# Patient Record
Sex: FEMALE | Race: WHITE | Employment: UNEMPLOYED | URBAN - METROPOLITAN AREA
[De-identification: names, ages, dates, MRNs, and addresses within clinical notes are randomized per-mention and may not be internally consistent; named-entity substitution may affect disease eponyms.]

---

## 2017-02-13 PROCEDURE — 84702 CHORIONIC GONADOTROPIN TEST: CPT | Performed by: OBSTETRICS & GYNECOLOGY

## 2017-02-13 PROCEDURE — 36415 COLL VENOUS BLD VENIPUNCTURE: CPT | Performed by: OBSTETRICS & GYNECOLOGY

## 2017-02-15 PROCEDURE — 84702 CHORIONIC GONADOTROPIN TEST: CPT | Performed by: OBSTETRICS & GYNECOLOGY

## 2017-02-15 PROCEDURE — 84144 ASSAY OF PROGESTERONE: CPT | Performed by: OBSTETRICS & GYNECOLOGY

## 2017-02-15 PROCEDURE — 36415 COLL VENOUS BLD VENIPUNCTURE: CPT | Performed by: OBSTETRICS & GYNECOLOGY

## 2017-08-06 PROBLEM — M25.512 ACUTE PAIN OF LEFT SHOULDER: Status: ACTIVE | Noted: 2017-08-06

## 2017-08-30 PROBLEM — M75.42 IMPINGEMENT SYNDROME OF LEFT SHOULDER: Status: ACTIVE | Noted: 2017-08-30

## 2017-10-03 ENCOUNTER — APPOINTMENT (OUTPATIENT)
Dept: LAB | Age: 37
End: 2017-10-03
Attending: INTERNAL MEDICINE
Payer: COMMERCIAL

## 2017-10-03 DIAGNOSIS — J02.9 SORE THROAT: ICD-10-CM

## 2017-10-03 PROCEDURE — 87081 CULTURE SCREEN ONLY: CPT

## 2018-02-14 ENCOUNTER — APPOINTMENT (OUTPATIENT)
Dept: LAB | Age: 38
End: 2018-02-14
Attending: INTERNAL MEDICINE
Payer: COMMERCIAL

## 2018-02-14 PROCEDURE — 87502 INFLUENZA DNA AMP PROBE: CPT | Performed by: INTERNAL MEDICINE

## 2018-02-14 PROCEDURE — 87798 DETECT AGENT NOS DNA AMP: CPT | Performed by: INTERNAL MEDICINE

## 2018-03-19 PROCEDURE — 84144 ASSAY OF PROGESTERONE: CPT | Performed by: OBSTETRICS & GYNECOLOGY

## 2018-03-19 PROCEDURE — 36415 COLL VENOUS BLD VENIPUNCTURE: CPT | Performed by: OBSTETRICS & GYNECOLOGY

## 2018-04-24 PROCEDURE — 88175 CYTOPATH C/V AUTO FLUID REDO: CPT | Performed by: OBSTETRICS & GYNECOLOGY

## 2018-04-24 PROCEDURE — 87086 URINE CULTURE/COLONY COUNT: CPT | Performed by: OBSTETRICS & GYNECOLOGY

## 2018-04-24 PROCEDURE — 87624 HPV HI-RISK TYP POOLED RSLT: CPT | Performed by: OBSTETRICS & GYNECOLOGY

## 2018-05-02 PROCEDURE — 87389 HIV-1 AG W/HIV-1&-2 AB AG IA: CPT | Performed by: OBSTETRICS & GYNECOLOGY

## 2018-05-02 PROCEDURE — 86900 BLOOD TYPING SEROLOGIC ABO: CPT | Performed by: OBSTETRICS & GYNECOLOGY

## 2018-05-02 PROCEDURE — 85025 COMPLETE CBC W/AUTO DIFF WBC: CPT | Performed by: OBSTETRICS & GYNECOLOGY

## 2018-05-02 PROCEDURE — 86870 RBC ANTIBODY IDENTIFICATION: CPT | Performed by: OBSTETRICS & GYNECOLOGY

## 2018-05-02 PROCEDURE — 86077 PHYS BLOOD BANK SERV XMATCH: CPT | Performed by: OBSTETRICS & GYNECOLOGY

## 2018-05-02 PROCEDURE — 87340 HEPATITIS B SURFACE AG IA: CPT | Performed by: OBSTETRICS & GYNECOLOGY

## 2018-05-02 PROCEDURE — 86901 BLOOD TYPING SEROLOGIC RH(D): CPT | Performed by: OBSTETRICS & GYNECOLOGY

## 2018-05-02 PROCEDURE — 86762 RUBELLA ANTIBODY: CPT | Performed by: OBSTETRICS & GYNECOLOGY

## 2018-05-02 PROCEDURE — 86780 TREPONEMA PALLIDUM: CPT | Performed by: OBSTETRICS & GYNECOLOGY

## 2018-05-02 PROCEDURE — 36415 COLL VENOUS BLD VENIPUNCTURE: CPT | Performed by: OBSTETRICS & GYNECOLOGY

## 2018-05-02 PROCEDURE — 86850 RBC ANTIBODY SCREEN: CPT | Performed by: OBSTETRICS & GYNECOLOGY

## 2018-06-22 PROCEDURE — 87510 GARDNER VAG DNA DIR PROBE: CPT | Performed by: OBSTETRICS & GYNECOLOGY

## 2018-06-22 PROCEDURE — 87660 TRICHOMONAS VAGIN DIR PROBE: CPT | Performed by: OBSTETRICS & GYNECOLOGY

## 2018-06-22 PROCEDURE — 87480 CANDIDA DNA DIR PROBE: CPT | Performed by: OBSTETRICS & GYNECOLOGY

## 2018-07-05 ENCOUNTER — OFFICE VISIT (OUTPATIENT)
Dept: PERINATAL CARE | Facility: HOSPITAL | Age: 38
End: 2018-07-05
Attending: OBSTETRICS & GYNECOLOGY
Payer: COMMERCIAL

## 2018-07-05 VITALS
HEART RATE: 78 BPM | DIASTOLIC BLOOD PRESSURE: 63 MMHG | BODY MASS INDEX: 28.58 KG/M2 | SYSTOLIC BLOOD PRESSURE: 121 MMHG | WEIGHT: 193 LBS | HEIGHT: 69 IN

## 2018-07-05 DIAGNOSIS — O09.299 HISTORY OF OLIGOHYDRAMNIOS IN PRIOR PREGNANCY, CURRENTLY PREGNANT: ICD-10-CM

## 2018-07-05 DIAGNOSIS — O09.522 ELDERLY MULTIGRAVIDA IN SECOND TRIMESTER: ICD-10-CM

## 2018-07-05 PROBLEM — O09.529 AMA (ADVANCED MATERNAL AGE) MULTIGRAVIDA 35+: Status: ACTIVE | Noted: 2018-07-05

## 2018-07-05 PROBLEM — O09.529 AMA (ADVANCED MATERNAL AGE) MULTIGRAVIDA 35+ (HCC): Status: ACTIVE | Noted: 2018-07-05

## 2018-07-05 PROCEDURE — 76811 OB US DETAILED SNGL FETUS: CPT | Performed by: OBSTETRICS & GYNECOLOGY

## 2018-07-05 PROCEDURE — 99243 OFF/OP CNSLTJ NEW/EST LOW 30: CPT | Performed by: OBSTETRICS & GYNECOLOGY

## 2018-07-05 NOTE — PROGRESS NOTES
Pt here for level 2 ultrasound accompanied by her    States + occasional flutters  Had some pink discharge last week.  States OB aware

## 2018-07-05 NOTE — PROGRESS NOTES
Outpatient Maternal-Fetal Medicine Consultation    Dear Dr. Ayan Vásquez,    Thank you for requesting ultrasound evaluation and maternal fetal medicine consultation on your patient Sondra Murray.   As you are aware she is a 40year old female with a Singleto . She indicated that both of her sons are alive. She indicated that the status of her paternal aunt is unknown.    She reports no family history of birth defects, developmental delay, stillbirth or  deaths in her family or her 's fami OFD, HC, TCD, AC, FL and HUM.  ____________________________________________________________________________  Anatomy Scan:  Reddy gestation.   Biometry:  BPD 49.5 mm 86th% 21w0d (20w3d to 21w4d)  .7 mm 54th% 20w2d (18w5d to 21w5d)  .7 mm 79t associated with elevated risks.  Specifically, there is a higher rate of:  · Fetal malformations  · Preeclampsia  · Gestational diabetes  · Intrauterine fetal death    As a result, enhanced pregnancy surveillance is advised for these patients including a co women 28years of age or older. In this model, weekly testing starting at 36 weeks of gestation would drop the risk of fetal death from 5.2 to 1.3 per 1000 pregnancies.  While a policy of antepartum testing in older women does increase the chance that a wom lowest false positive rate) for the detection of fetal aneuploidy amongst high-risk patients. The limitations of detailed mid-trimester sonography was reviewed with the patient.  First trimester screening and second trimester multiple-marker serum serum scr

## 2018-08-09 ENCOUNTER — TELEPHONE (OUTPATIENT)
Dept: OBGYN CLINIC | Facility: CLINIC | Age: 38
End: 2018-08-09

## 2018-08-09 NOTE — TELEPHONE ENCOUNTER
Chart sent to scan dept. pt requesting her medical recoreds to be sent to dr Saray Alberts @ [facility] [place] [facility] [address] [place] [id number] # [phone]

## 2018-08-22 PROCEDURE — 87389 HIV-1 AG W/HIV-1&-2 AB AG IA: CPT | Performed by: OBSTETRICS & GYNECOLOGY

## 2018-11-02 ENCOUNTER — HOSPITAL ENCOUNTER (OUTPATIENT)
Facility: HOSPITAL | Age: 38
Setting detail: OBSERVATION
Discharge: HOME | End: 2018-11-02
Attending: OBSTETRICS & GYNECOLOGY | Admitting: OBSTETRICS & GYNECOLOGY
Payer: COMMERCIAL

## 2018-11-02 VITALS — WEIGHT: 218 LBS | HEIGHT: 69.02 IN | BODY MASS INDEX: 32.29 KG/M2

## 2018-11-02 PROBLEM — Z34.90 PREGNANCY (HCC): Status: ACTIVE | Noted: 2018-11-02

## 2018-11-02 PROBLEM — Z34.90 PREGNANCY: Status: ACTIVE | Noted: 2018-11-02

## 2018-11-02 PROCEDURE — 10S0XZZ REPOSITION PRODUCTS OF CONCEPTION, EXTERNAL APPROACH: ICD-10-PCS | Performed by: OBSTETRICS & GYNECOLOGY

## 2018-11-02 PROCEDURE — 59412 ANTEPARTUM MANIPULATION: CPT

## 2018-11-02 PROCEDURE — 96372 THER/PROPH/DIAG INJ SC/IM: CPT

## 2018-11-02 PROCEDURE — 59025 FETAL NON-STRESS TEST: CPT

## 2018-11-02 RX ORDER — TERBUTALINE SULFATE 1 MG/ML
INJECTION, SOLUTION SUBCUTANEOUS
Status: COMPLETED
Start: 2018-11-02 | End: 2018-11-02

## 2018-11-02 RX ORDER — TERBUTALINE SULFATE 1 MG/ML
0.25 INJECTION, SOLUTION SUBCUTANEOUS ONCE
Status: COMPLETED | OUTPATIENT
Start: 2018-11-02 | End: 2018-11-02

## 2018-11-02 NOTE — PROCEDURES
A nonstress test was performed and found to be reactive. An ultrasound revealed that the baby was complete breech with the head in the maternal right upper quadrant. Informed consent was obtained. 0.25mg terbutaline was given subcutaneously.  Gel was applie

## 2018-11-02 NOTE — H&P
[address] Patient Status:  Outpatient in a Bed    1980 MRN [id number]   Location [facility] Attending Scottie Tavares MD   Hosp Day # 0 PCP Monica Pritchett MD     Date of Admi Known Problems Sister    • No Known Problems Brother    • No Known Problems Sister    • No Known Problems Brother      Social History: Social History    Tobacco Use      Smoking status: Never Smoker      Smokeless tobacco: Never Used    Alcohol use:  No complication of nonreactive fetal heart tones immediately post-procedure was reviewed. It was explained that serious complications like labor, placental abruption, SROM, and cord accident likely occur <6% of the time.  Her questions were answered  2) Terbut

## 2018-11-02 NOTE — NST
Nonstress Test   Patient: Brittany Kirkpatrick    Gestation: 37w1d    NST:       Variability: Moderate           Accelerations: Yes           Decelerations: None            Baseline: 145 BPM           Uterine Irritability: No           Contractions: Not pres

## 2018-11-10 ENCOUNTER — HOSPITAL ENCOUNTER (INPATIENT)
Facility: HOSPITAL | Age: 38
LOS: 3 days | Discharge: HOME | End: 2018-11-13
Attending: OBSTETRICS & GYNECOLOGY | Admitting: OBSTETRICS & GYNECOLOGY
Payer: COMMERCIAL

## 2018-11-10 PROCEDURE — 86901 BLOOD TYPING SEROLOGIC RH(D): CPT | Performed by: OBSTETRICS & GYNECOLOGY

## 2018-11-10 PROCEDURE — 81002 URINALYSIS NONAUTO W/O SCOPE: CPT

## 2018-11-10 PROCEDURE — 99214 OFFICE O/P EST MOD 30 MIN: CPT

## 2018-11-10 PROCEDURE — 86900 BLOOD TYPING SEROLOGIC ABO: CPT | Performed by: OBSTETRICS & GYNECOLOGY

## 2018-11-10 PROCEDURE — 84112 EVAL AMNIOTIC FLUID PROTEIN: CPT

## 2018-11-10 PROCEDURE — 86850 RBC ANTIBODY SCREEN: CPT | Performed by: OBSTETRICS & GYNECOLOGY

## 2018-11-10 PROCEDURE — 86780 TREPONEMA PALLIDUM: CPT | Performed by: OBSTETRICS & GYNECOLOGY

## 2018-11-10 PROCEDURE — 86870 RBC ANTIBODY IDENTIFICATION: CPT | Performed by: OBSTETRICS & GYNECOLOGY

## 2018-11-10 PROCEDURE — 85027 COMPLETE CBC AUTOMATED: CPT | Performed by: OBSTETRICS & GYNECOLOGY

## 2018-11-10 RX ORDER — TERBUTALINE SULFATE 1 MG/ML
0.25 INJECTION, SOLUTION SUBCUTANEOUS AS NEEDED
Status: DISCONTINUED | OUTPATIENT
Start: 2018-11-10 | End: 2018-11-11

## 2018-11-10 RX ORDER — DEXTROSE, SODIUM CHLORIDE, SODIUM LACTATE, POTASSIUM CHLORIDE, AND CALCIUM CHLORIDE 5; .6; .31; .03; .02 G/100ML; G/100ML; G/100ML; G/100ML; G/100ML
INJECTION, SOLUTION INTRAVENOUS AS NEEDED
Status: DISCONTINUED | OUTPATIENT
Start: 2018-11-10 | End: 2018-11-11

## 2018-11-10 RX ORDER — SODIUM CHLORIDE, SODIUM LACTATE, POTASSIUM CHLORIDE, CALCIUM CHLORIDE 600; 310; 30; 20 MG/100ML; MG/100ML; MG/100ML; MG/100ML
INJECTION, SOLUTION INTRAVENOUS CONTINUOUS
Status: DISCONTINUED | OUTPATIENT
Start: 2018-11-10 | End: 2018-11-11

## 2018-11-10 RX ORDER — DOCUSATE SODIUM 100 MG/1
100 CAPSULE, LIQUID FILLED ORAL
Status: ON HOLD | COMMUNITY
End: 2018-12-14

## 2018-11-10 RX ORDER — NALBUPHINE HCL 10 MG/ML
2.5 AMPUL (ML) INJECTION
Status: DISCONTINUED | OUTPATIENT
Start: 2018-11-10 | End: 2018-11-11

## 2018-11-10 RX ORDER — EPHEDRINE SULFATE/0.9% NACL/PF 25 MG/5 ML
5 SYRINGE (ML) INTRAVENOUS AS NEEDED
Status: DISCONTINUED | OUTPATIENT
Start: 2018-11-10 | End: 2018-11-11

## 2018-11-10 RX ORDER — IBUPROFEN 600 MG/1
600 TABLET ORAL ONCE AS NEEDED
Status: DISCONTINUED | OUTPATIENT
Start: 2018-11-10 | End: 2018-11-11

## 2018-11-10 RX ORDER — TRISODIUM CITRATE DIHYDRATE AND CITRIC ACID MONOHYDRATE 500; 334 MG/5ML; MG/5ML
30 SOLUTION ORAL AS NEEDED
Status: DISCONTINUED | OUTPATIENT
Start: 2018-11-10 | End: 2018-11-11

## 2018-11-11 ENCOUNTER — TELEPHONE (OUTPATIENT)
Dept: OBGYN UNIT | Facility: HOSPITAL | Age: 38
End: 2018-11-11

## 2018-11-11 PROBLEM — Z34.90 PREGNANT (HCC): Status: ACTIVE | Noted: 2018-11-11

## 2018-11-11 PROBLEM — Z34.90 PREGNANT: Status: ACTIVE | Noted: 2018-11-11

## 2018-11-11 PROCEDURE — 85025 COMPLETE CBC W/AUTO DIFF WBC: CPT | Performed by: OBSTETRICS & GYNECOLOGY

## 2018-11-11 PROCEDURE — 87075 CULTR BACTERIA EXCEPT BLOOD: CPT | Performed by: OBSTETRICS & GYNECOLOGY

## 2018-11-11 PROCEDURE — 87205 SMEAR GRAM STAIN: CPT | Performed by: OBSTETRICS & GYNECOLOGY

## 2018-11-11 PROCEDURE — 87147 CULTURE TYPE IMMUNOLOGIC: CPT | Performed by: OBSTETRICS & GYNECOLOGY

## 2018-11-11 PROCEDURE — 0UQMXZZ REPAIR VULVA, EXTERNAL APPROACH: ICD-10-PCS | Performed by: OBSTETRICS & GYNECOLOGY

## 2018-11-11 PROCEDURE — 85027 COMPLETE CBC AUTOMATED: CPT | Performed by: OBSTETRICS & GYNECOLOGY

## 2018-11-11 PROCEDURE — 85007 BL SMEAR W/DIFF WBC COUNT: CPT | Performed by: OBSTETRICS & GYNECOLOGY

## 2018-11-11 PROCEDURE — 87186 SC STD MICRODIL/AGAR DIL: CPT | Performed by: OBSTETRICS & GYNECOLOGY

## 2018-11-11 PROCEDURE — 0HQ9XZZ REPAIR PERINEUM SKIN, EXTERNAL APPROACH: ICD-10-PCS | Performed by: OBSTETRICS & GYNECOLOGY

## 2018-11-11 PROCEDURE — 87070 CULTURE OTHR SPECIMN AEROBIC: CPT | Performed by: OBSTETRICS & GYNECOLOGY

## 2018-11-11 RX ORDER — CEFAZOLIN SODIUM/WATER 2 G/20 ML
2 SYRINGE (ML) INTRAVENOUS EVERY 6 HOURS
Status: DISCONTINUED | OUTPATIENT
Start: 2018-11-11 | End: 2018-11-12

## 2018-11-11 RX ORDER — CEFAZOLIN SODIUM/WATER 2 G/20 ML
2 SYRINGE (ML) INTRAVENOUS EVERY 6 HOURS
Status: DISCONTINUED | OUTPATIENT
Start: 2018-11-11 | End: 2018-11-11

## 2018-11-11 RX ORDER — ZOLPIDEM TARTRATE 5 MG/1
5 TABLET ORAL NIGHTLY PRN
Status: DISCONTINUED | OUTPATIENT
Start: 2018-11-11 | End: 2018-11-13

## 2018-11-11 RX ORDER — IBUPROFEN 600 MG/1
600 TABLET ORAL EVERY 6 HOURS
Status: DISCONTINUED | OUTPATIENT
Start: 2018-11-11 | End: 2018-11-13

## 2018-11-11 RX ORDER — SIMETHICONE 80 MG
80 TABLET,CHEWABLE ORAL 3 TIMES DAILY PRN
Status: DISCONTINUED | OUTPATIENT
Start: 2018-11-11 | End: 2018-11-13

## 2018-11-11 RX ORDER — BISACODYL 10 MG
10 SUPPOSITORY, RECTAL RECTAL ONCE AS NEEDED
Status: ACTIVE | OUTPATIENT
Start: 2018-11-11 | End: 2018-11-11

## 2018-11-11 RX ORDER — ACETAMINOPHEN 325 MG/1
650 TABLET ORAL EVERY 6 HOURS PRN
Status: DISCONTINUED | OUTPATIENT
Start: 2018-11-11 | End: 2018-11-13

## 2018-11-11 RX ORDER — DOCUSATE SODIUM 100 MG/1
100 CAPSULE, LIQUID FILLED ORAL
Status: DISCONTINUED | OUTPATIENT
Start: 2018-11-11 | End: 2018-11-13

## 2018-11-11 NOTE — PROGRESS NOTES
Patient admitted to mother baby unit with infant. Hugs and kiss tag applied and bonded in labor and delivery. Call light within reach.

## 2018-11-11 NOTE — PROGRESS NOTES
Pt is a 41year old female admitted to TR5/TRG5-A.    Patient presents with:  R/o Rom: large brownish red mucous discharge ~0190-1293, progressing to clear watery discharge ~1500 on; irregular mild ctxs; +FM; denies bright red vaginal bleeding; last SVE in

## 2018-11-11 NOTE — L&D DELIVERY NOTE
Barbara, Girl  [[id number]]    Labor Events     labor?:  No   steroids?:  None  Antibiotics received during labor?:  No  Rupture date/time:  11/10/2018 1500   Rupture type:  SROM  Fluid color:  Clear  Induction:  None  Augmentation:  Oxytoc Reflex irritability No response Grimace Cry or active withdrawal    Muscle tone Limp Some flexion Active motion    Respiratory effort Absent Weak cry; hypoventilation Good, crying               1 Minute:   5 Minute:   10 Minute:   15 Minute:   20 Minute:

## 2018-11-11 NOTE — H&P
Pt is 40 y/o [id number] who presented with SROM about 3 yesterday afternoon     Had external version for breech presentation  PMH -  X 2             SAB - didn't handle it well - was seeing a therapist             Left ankle fx - screws placed

## 2018-11-11 NOTE — PROGRESS NOTES
Pt & infant transferred to m/b unit by w/c in stable condition.  Report given to [facility]

## 2018-11-12 PROCEDURE — 87040 BLOOD CULTURE FOR BACTERIA: CPT | Performed by: OBSTETRICS & GYNECOLOGY

## 2018-11-12 PROCEDURE — 85025 COMPLETE CBC W/AUTO DIFF WBC: CPT | Performed by: OBSTETRICS & GYNECOLOGY

## 2018-11-12 RX ORDER — POLYETHYLENE GLYCOL 3350 17 G/17G
17 POWDER, FOR SOLUTION ORAL DAILY
Status: DISCONTINUED | OUTPATIENT
Start: 2018-11-12 | End: 2018-11-13

## 2018-11-12 NOTE — PROGRESS NOTES
DR Edgard Jordan CALLED BACK . SHE WANTS TO REPEAT TEMP IN 1 HR SINCE PT HAD JUST TAKEN HOT SHOWER. WILL NOTIFY HER IF TEMP >100.4 IN 1 HOUR AS ORDERED.

## 2018-11-12 NOTE — PROGRESS NOTES
11/12/18 1630   Provider Notification   Reason for Communication Other (comment)  (condition update)   Provider Name Other (comment)  (Kianna Garcia)   Method of Communication Call   Response No new orders  (continue to monitor)   Notification Time 1631       Pe

## 2018-11-12 NOTE — PROGRESS NOTES
PT JUST SHOWERED. C/O CHILLS AND TREMBLING. SETTLED PT BACK INTO BED. CHECKED TEMP-101. 3. DR Angel Davila NOTIFIED AND IS CURRENTLY BUSY. SHE WILL CALL BACK WHEN AVAILABLE.

## 2018-11-12 NOTE — PROGRESS NOTES
[facility]  Post-Partum Progress Note    Minor Zoila Patient Status:  Inpatient    1980 MRN [id number]   [facility] 2SW-J Attending Estela Wylie MD   Hosp Day # 2 PCP Qamar Covarrubias MD     SUBJECTIVE:  Patient doing well

## 2018-11-13 VITALS
RESPIRATION RATE: 18 BRPM | TEMPERATURE: 98 F | WEIGHT: 221 LBS | HEIGHT: 69 IN | OXYGEN SATURATION: 99 % | BODY MASS INDEX: 32.73 KG/M2 | SYSTOLIC BLOOD PRESSURE: 118 MMHG | HEART RATE: 74 BPM | DIASTOLIC BLOOD PRESSURE: 77 MMHG

## 2018-11-13 PROBLEM — Z34.90 PREGNANCY: Status: RESOLVED | Noted: 2018-11-02 | Resolved: 2018-11-13

## 2018-11-13 PROBLEM — O09.529 AMA (ADVANCED MATERNAL AGE) MULTIGRAVIDA 35+: Status: RESOLVED | Noted: 2018-07-05 | Resolved: 2018-11-13

## 2018-11-13 PROBLEM — Z34.90 PREGNANCY (HCC): Status: RESOLVED | Noted: 2018-11-02 | Resolved: 2018-11-13

## 2018-11-13 PROBLEM — Z34.90 PREGNANT: Status: RESOLVED | Noted: 2018-11-11 | Resolved: 2018-11-13

## 2018-11-13 PROBLEM — O09.529 AMA (ADVANCED MATERNAL AGE) MULTIGRAVIDA 35+ (HCC): Status: RESOLVED | Noted: 2018-07-05 | Resolved: 2018-11-13

## 2018-11-13 PROBLEM — Z34.90 PREGNANT (HCC): Status: RESOLVED | Noted: 2018-11-11 | Resolved: 2018-11-13

## 2018-11-13 PROCEDURE — 85025 COMPLETE CBC W/AUTO DIFF WBC: CPT | Performed by: OBSTETRICS & GYNECOLOGY

## 2018-11-13 RX ORDER — IBUPROFEN 600 MG/1
600 TABLET ORAL EVERY 6 HOURS PRN
Qty: 60 TABLET | Refills: 0 | Status: SHIPPED | OUTPATIENT
Start: 2018-11-13 | End: 2019-11-25 | Stop reason: ALTCHOICE

## 2018-11-13 NOTE — PROGRESS NOTES
Baby breastfeeding well, all dc teaching reviewed earlier and all questions answered. Pt states comfortable caring for self and baby. Discharged in stable condition with family and accompanied by staff at [phone] per ambulatory.

## 2018-11-13 NOTE — PROGRESS NOTES
OB Progress Note PPD#2  S: Feels well. Ambulating, eating. Pain controlled. Minimal VB. Breastfeeding. Hasn't felt feverish since yesterday. O:   Blood pressure 118/77, pulse 74, temperature 97.7 °F (36.5 °C), temperature source Oral, resp.  rate 18, he

## 2018-11-13 NOTE — PROGRESS NOTES
Dr Clement Oshea called and aware that [facility] will not be back til 1500 today, pt afebrile and denies pain, ok to dc soon.

## 2018-11-15 ENCOUNTER — TELEPHONE (OUTPATIENT)
Dept: OBGYN UNIT | Facility: HOSPITAL | Age: 38
End: 2018-11-15

## 2018-11-17 ENCOUNTER — NURSE ONLY (OUTPATIENT)
Dept: LACTATION | Facility: HOSPITAL | Age: 38
End: 2018-11-17
Attending: OBSTETRICS & GYNECOLOGY
Payer: COMMERCIAL

## 2018-11-17 VITALS — TEMPERATURE: 98 F

## 2018-11-17 DIAGNOSIS — O92.79 PAINFUL LACTATION: Primary | ICD-10-CM

## 2018-11-17 PROCEDURE — 99214 OFFICE O/P EST MOD 30 MIN: CPT

## 2018-11-17 NOTE — PATIENT INSTRUCTIONS
Key Augustine weighs 6lbs 13.3oz, a weight loss of 1.7oz since last Pediatrician appointment, 2 days ago. She is jaundiced and sleepy. Soraya's nipples are sore and abraded, breasts full with a plugged duct in left breast upper quadrant, last feed 4 hours ago. lightly down center of lips  · Wait for wide mouth with tongue cupped at bottom of mouth. · Chin should be deep into breast, with some room between nose and the breast.   · If needed, gently draw chin down lower to deepen latch.     Is baby taking enough b planned. Call you baby's doctor with questions as well. Weight check sooner if wet or stool diapers decrease. Have weight checked again within 1-3 days of decreasing/stopping supplements.      For additional information: [employer]  w area(s)  · Check with your doctor about taking ibuprofen for relief of discomfort. · Continue to take antibiotic as prescribed even though you may quickly feel better.    · Contact your doctor is you are not feeling significantly better within 1-2 days of

## 2018-11-18 ENCOUNTER — HOSPITAL ENCOUNTER (INPATIENT)
Facility: HOSPITAL | Age: 38
LOS: 1 days | Discharge: HOME | DRG: 776 | End: 2018-11-20
Attending: EMERGENCY MEDICINE | Admitting: OBSTETRICS & GYNECOLOGY
Payer: COMMERCIAL

## 2018-11-18 DIAGNOSIS — R10.9 ABDOMINAL PAIN, ACUTE: ICD-10-CM

## 2018-11-18 DIAGNOSIS — R50.9 FEVER, UNSPECIFIED FEVER CAUSE: Primary | ICD-10-CM

## 2018-11-18 PROCEDURE — 99285 EMERGENCY DEPT VISIT HI MDM: CPT

## 2018-11-18 PROCEDURE — 87040 BLOOD CULTURE FOR BACTERIA: CPT | Performed by: EMERGENCY MEDICINE

## 2018-11-18 PROCEDURE — 87205 SMEAR GRAM STAIN: CPT | Performed by: EMERGENCY MEDICINE

## 2018-11-18 PROCEDURE — 87186 SC STD MICRODIL/AGAR DIL: CPT | Performed by: EMERGENCY MEDICINE

## 2018-11-18 PROCEDURE — 85025 COMPLETE CBC W/AUTO DIFF WBC: CPT | Performed by: EMERGENCY MEDICINE

## 2018-11-18 PROCEDURE — 96375 TX/PRO/DX INJ NEW DRUG ADDON: CPT

## 2018-11-18 PROCEDURE — 87147 CULTURE TYPE IMMUNOLOGIC: CPT | Performed by: EMERGENCY MEDICINE

## 2018-11-18 PROCEDURE — 87070 CULTURE OTHR SPECIMN AEROBIC: CPT | Performed by: EMERGENCY MEDICINE

## 2018-11-18 PROCEDURE — 36415 COLL VENOUS BLD VENIPUNCTURE: CPT

## 2018-11-18 PROCEDURE — 81001 URINALYSIS AUTO W/SCOPE: CPT | Performed by: EMERGENCY MEDICINE

## 2018-11-18 PROCEDURE — 80053 COMPREHEN METABOLIC PANEL: CPT | Performed by: EMERGENCY MEDICINE

## 2018-11-18 PROCEDURE — 96365 THER/PROPH/DIAG IV INF INIT: CPT

## 2018-11-18 RX ORDER — DIPHENHYDRAMINE HYDROCHLORIDE 50 MG/ML
25 INJECTION INTRAMUSCULAR; INTRAVENOUS ONCE
Status: COMPLETED | OUTPATIENT
Start: 2018-11-18 | End: 2018-11-18

## 2018-11-19 PROBLEM — R10.9 ABDOMINAL PAIN, ACUTE: Status: ACTIVE | Noted: 2018-11-19

## 2018-11-19 PROBLEM — R50.9 FEVER, UNSPECIFIED FEVER CAUSE: Status: ACTIVE | Noted: 2018-11-19

## 2018-11-19 PROCEDURE — 87999 UNLISTED MICROBIOLOGY PX: CPT

## 2018-11-19 PROCEDURE — 87486 CHLMYD PNEUM DNA AMP PROBE: CPT | Performed by: EMERGENCY MEDICINE

## 2018-11-19 PROCEDURE — 87581 M.PNEUMON DNA AMP PROBE: CPT | Performed by: EMERGENCY MEDICINE

## 2018-11-19 PROCEDURE — 87798 DETECT AGENT NOS DNA AMP: CPT | Performed by: EMERGENCY MEDICINE

## 2018-11-19 PROCEDURE — 87633 RESP VIRUS 12-25 TARGETS: CPT | Performed by: EMERGENCY MEDICINE

## 2018-11-19 RX ORDER — IBUPROFEN 600 MG/1
600 TABLET ORAL EVERY 6 HOURS PRN
Status: DISCONTINUED | OUTPATIENT
Start: 2018-11-19 | End: 2018-11-20

## 2018-11-19 RX ORDER — ONDANSETRON 2 MG/ML
4 INJECTION INTRAMUSCULAR; INTRAVENOUS EVERY 6 HOURS PRN
Status: DISCONTINUED | OUTPATIENT
Start: 2018-11-19 | End: 2018-11-20

## 2018-11-19 RX ORDER — DIPHENHYDRAMINE HYDROCHLORIDE 50 MG/ML
25 INJECTION INTRAMUSCULAR; INTRAVENOUS EVERY 6 HOURS PRN
Status: DISCONTINUED | OUTPATIENT
Start: 2018-11-19 | End: 2018-11-20

## 2018-11-19 RX ORDER — ACETAMINOPHEN 500 MG
1000 TABLET ORAL EVERY 6 HOURS PRN
Status: DISCONTINUED | OUTPATIENT
Start: 2018-11-19 | End: 2018-11-20

## 2018-11-19 RX ORDER — DOCUSATE SODIUM 100 MG/1
100 CAPSULE, LIQUID FILLED ORAL 2 TIMES DAILY
Status: DISCONTINUED | OUTPATIENT
Start: 2018-11-19 | End: 2018-11-20

## 2018-11-19 NOTE — PROGRESS NOTES
HD#2  Pt admitted last night for lower/midline abdominal pain and rash.  Pt is s/p  1 week ago (11/10)- spiked to temp of 102.7 immediately after delivery - received IV ancef for 4 doses after delivery - fever resolved - WBC 34K - WBC responded and on da

## 2018-11-19 NOTE — PAYOR COMM NOTE
--------------  ADMISSION REVIEW     Payor: MARIIA IZQUIERDO  Subscriber #:  [id number]  Authorization Number: [id number]    Admit date: 11/19/18  Admit time: 0008       Admitting Physician: Jose Cardoso MD  Attending Physician:  Jose Cardoso MD  [facility] nausea only   • Vaginal discharge        Past Surgical History:   Procedure Laterality Date   • OTHER SURGICAL HISTORY  1991    ankle surgery @ growth plate   • WISDOM TEETH REMOVED      as a teenager           Social History    Tobacco Use      Smokin for the following components:    WBC 13.7 (*)     Neutrophil Absolute Prelim 10.61 (*)     Neutrophil Absolute 10.61 (*)     All other components within normal limits   CBC WITH DIFFERENTIAL WITH PLATELET    Narrative:      The following orders were created List        Present on Admission  Date Reviewed: 11/13/2018          ICD-10-CM Noted POA    Fever R50.9 11/18/2018 Unknown              Signed by Tabby Merino MD on 11/18/2018 10:54 PM         H&P Note     No notes of this type exist for this encounter. presented to ED on 11/18 with fever, and hives. She note some pain in the suprapubic area.  She has received zosyn, hives are fading on benadryl.         History:  Past Medical History        Past Medical History:   Diagnosis Date   • Anxiety 2017     seein Intravenous, Q6H PRN  •  docusate sodium (COLACE) cap 100 mg, 100 mg, Oral, BID  •  Piperacillin Sod-Tazobactam So (ZOSYN) 3.375 g in dextrose 5 % 100 mL ADD-vantage, 3.375 g, Intravenous, Q8H  •  ibuprofen (MOTRIN) tab 600 mg, 600 mg, Oral, Q6H PRN     No MCH  31.9   MCHC  34.7   RDW  12.8   NEPRELIM  10.61*   WBC  13.7*   PLT  272.0             Recent Labs   Lab  11/18/18   2118   GLU  109*   BUN  12   CREATSERUM  0.66   GFRAA  131   GFRNAA  113   CA  8.3   ALB  2.9*   NA  136   K  3.6   CL  105   CO2  2

## 2018-11-19 NOTE — ED INITIAL ASSESSMENT (HPI)
Pt arrived with C/O fever. Fever present since during vaginal delivery (7 days prior to visit). Pt spoke to OB earlier and took ibprofen for fever, and preceded to ED. Pt also presents with cramping, rash.

## 2018-11-19 NOTE — PROGRESS NOTES
[facility] Note: Antimicrobial Weight Dose Adjustment for: piperacillin/tazobactam (Cristal Goodness)    Bill Cheek is a 41year old female who has been prescribed piperacillin/tazobactam (ZOSYN) 3.375 gram IV for one dose.   CrCl is CrCl cannot be calculate

## 2018-11-19 NOTE — PLAN OF CARE
NURSING ADMISSION NOTE      Patient admitted via cart  Oriented to room. Safety precautions initiated. Bed in low position. Call light in reach. Pt alert and oriented x4. Pt with raised rashes to full truck, back, buttocks, becca area, and groin.

## 2018-11-19 NOTE — PLAN OF CARE
Patient/Family Goals    • Patient/Family Long Term Goal Progressing    • Patient/Family Short Term Goal Progressing          Pt alert and oriented x4. Pt denies SOB and VSS. Afebrile so far this shift. IV antibiotics given and tolerated well.  Pt up and amb

## 2018-11-19 NOTE — CONSULTS
[facility]OUS DISEASE [facility] Ludy [facility] Patient Status:  Inpatient    1980 MRN [id number]   [facility] 4NW-A Attending Lexi Astudillo MD   Hosp Day # 0 KOBI Garcia lotions\"  Cat Dander [Dander]     ITCHING    Medications:    Current Facility-Administered Medications:   •  ondansetron HCl (ZOFRAN) injection 4 mg, 4 mg, Intravenous, Q6H PRN  •  acetaminophen (TYLENOL EXTRA STRENGTH) tab 1,000 mg, 1,000 mg, Oral, Q6H P Regular rate and rhythm. No murmurs. Abdomen: Soft, nontender, nondistended. Positive bowel sounds. Musculoskeletal: Full range of motion of all extremities. No swelling noted.   Joints: no effusions  Skin:hives trunk, have faded      Laboratory Data:

## 2018-11-19 NOTE — ED PROVIDER NOTES
Patient Seen in: [facility] Emergency Department    History   Patient presents with:  Fever (infectious)    Stated Complaint: fever s/p vaginal delivery 11/11/18    HPI    58-year-old female sent to the emergency room by her OB/GYN for evaluation of above.    Physical Exam     ED Triage Vitals   BP 11/18/18 2058 123/73   Pulse 11/18/18 2058 74   Resp 11/18/18 2058 16   Temp 11/18/18 2058 98.1 °F (36.7 °C)   Temp src 11/18/18 2058 Oral   SpO2 11/18/18 2056 97 %   O2 Device 11/18/18 2056 None (Room air) tests on the individual orders.    COMP METABOLIC PANEL (14)   RAINBOW DRAW BLUE   RAINBOW DRAW LAVENDER   RAINBOW DRAW LIGHT GREEN   RAINBOW DRAW GOLD   AEROBIC BACTERIAL CULTURE   BLOOD CULTURE   BLOOD CULTURE                MDM   41-year-old female cezar

## 2018-11-20 VITALS
OXYGEN SATURATION: 100 % | TEMPERATURE: 98 F | HEART RATE: 62 BPM | DIASTOLIC BLOOD PRESSURE: 80 MMHG | SYSTOLIC BLOOD PRESSURE: 125 MMHG | WEIGHT: 202.81 LBS | BODY MASS INDEX: 30.04 KG/M2 | RESPIRATION RATE: 16 BRPM | HEIGHT: 69 IN

## 2018-11-20 PROCEDURE — 85025 COMPLETE CBC W/AUTO DIFF WBC: CPT | Performed by: OBSTETRICS & GYNECOLOGY

## 2018-11-20 RX ORDER — AMOXICILLIN AND CLAVULANATE POTASSIUM 875; 125 MG/1; MG/1
1 TABLET, FILM COATED ORAL 2 TIMES DAILY
Qty: 14 TABLET | Refills: 0 | Status: SHIPPED | OUTPATIENT
Start: 2018-11-20 | End: 2018-11-27

## 2018-11-20 NOTE — PROGRESS NOTES
Patient had episode of chills and malaise last night, no fever. Breastfeeding--had a clogged duct--lactation helped resolve it. No further rash. Uterine pain is still present, improves with motrin/tylenol.     O:   11/20/18  0424   BP: 130/65   Pulse: 57

## 2018-11-20 NOTE — PLAN OF CARE
Skin integrity remains intact Adequate for Discharge    Afebrile, skin rashes are gone, denies any itching. Experiencing shooting pain to left breast, requesting lactation consult, patient thinks one of the ducts are clogged.   0900 Seen by lactation staff,

## 2018-11-20 NOTE — PLAN OF CARE
Patient/Family Goals    • Patient/Family Long Term Goal Progressing    • Patient/Family Short Term Goal Progressing        SKIN/TISSUE INTEGRITY - ADULT    • Skin integrity remains intact Progressing          Vital stable, afebrile.   Complaints of muscle a

## 2018-11-20 NOTE — PROGRESS NOTES
[facility]                INFECTIOUS DISEASE PROGRESS NOTE    Michelle Don Patient Status:  Inpatient    1980 MRN [id number]   [facility] 4NW-A Attending Julian Haynes MD   Hosp Day # 1 PCP Fay Corral MD     An Positive Cocci N/A           Problem list reviewed:  Patient Active Problem List:     [facility] [--/--/--] [Breast & Gyne Exams - Danielle Kuhn      Laboratory examination ordered as part of a routine general medical examination     Family

## 2018-11-23 NOTE — PAYOR COMM NOTE
--------------  DISCHARGE REVIEW    Payor: MARIIA WAITE  Subscriber #:  [id number]  Authorization Number: [id number]    Admit date: 11/19/18  Admit time:  0008  Discharge Date: 11/20/2018  2:15 PM     Admitting Physician: Stella Briggs MD  Attending y

## 2018-11-24 NOTE — H&P
[address] Patient Status:  Inpatient    1980 MRN [id number]   [facility] 4NW-A Attending No att. providers found   Hosp Day # 1 PCP Qamar Covarrubias MD     Date of Admission:  2018 Outcome Date GA Lbr Coy/2nd Weight Sex Delivery Anes PTL Lv   4 Term 11/11/18 38w3d 03:00 / 00:21 7 lb 1.2 oz (3.21 kg) F NORMAL SPONT EPI N SHIMON   3 SAB 02/2017     SAB      2 Term 04/22/13 37w3d  6 lb 14 oz (3.118 kg) M NORMAL SPONT EPI N SHIMON      Complic symmetric, no curvature. ROM normal. No CVA tenderness.   Lungs: normal unlabored breathing no wheezing  Breasts:  normal appearance, no masses or tenderness  Heart: regular rate and rhythm  Abdomen: soft, non-tender; bowel sounds normal; no masses,  no org

## 2018-11-28 ENCOUNTER — NURSE ONLY (OUTPATIENT)
Dept: LACTATION | Facility: HOSPITAL | Age: 38
End: 2018-11-28
Attending: OBSTETRICS & GYNECOLOGY
Payer: COMMERCIAL

## 2018-11-28 DIAGNOSIS — O92.79 PAINFUL LACTATION: Primary | ICD-10-CM

## 2018-11-28 PROCEDURE — 99213 OFFICE O/P EST LOW 20 MIN: CPT

## 2018-11-28 NOTE — PATIENT INSTRUCTIONS
Debbie Factor weighs 7lbs 7.1oz, an increase of 9.8oz since last visit, 11 days ago. She transfers 28ml on left breast and 50ml on right breast, a total feed of 78ml and satisfied.  Continue to feed on demand minimum 8-12 times per 24 hours, continue to supplemen feed 1-2 oz or more expressed milk or formula with a wide based, slow flow nipple.       Paced bottle feeding using a slow flow nipple:   · Hold your baby in an upright position, supporting the hand and neck with your hand, rather than in the crook of your

## 2018-12-06 ENCOUNTER — NURSE ONLY (OUTPATIENT)
Dept: LACTATION | Facility: HOSPITAL | Age: 38
End: 2018-12-06
Attending: OBSTETRICS & GYNECOLOGY
Payer: COMMERCIAL

## 2018-12-06 DIAGNOSIS — O92.5 SUPPRESSED LACTATION WITH INFANT BREAST ATTACHMENT DIFFICULTY: Primary | ICD-10-CM

## 2018-12-06 PROCEDURE — 99214 OFFICE O/P EST MOD 30 MIN: CPT

## 2018-12-06 NOTE — PATIENT INSTRUCTIONS
Nelly Huitron weighs 7lbs 13.5oz, an increase of 6.4oz since last visit, 8 days ago. She transfers 80ml from both breasts. Nipples red, bruised and compressed, shallow latch, slipping off, upper lip flared inward. Pediatric Dental consult scheduled for tomorrow. Remember pumping should never be painful. · If breastmilk flow is minimal, try increasing pressure gauge if it is comfortable to do so. NOTE: Initially, in the colostrum phase amounts vary from a few drops to 30cc (1oz.).   · If pumping is painful, turn t and your baby is important.     [place]ugreek (Herbal remedy):  · Contraindications: use during pregnancy, asthma, diabetes, anti-coagulant therapy, allergy to peanuts  · Side effects: maple like odor to sweat, milk and urine, diarrhea, increase in asthmatic sy slow flow nipple:   · Hold your baby in an upright position, supporting the hand and neck with your hand, rather than in the crook of your arm.    · Let you baby \"latch on\" to bottle: stroke nipple down from top lip to bottom, licking is good, wait for wi

## 2018-12-10 ENCOUNTER — TELEPHONE (OUTPATIENT)
Dept: LACTATION | Facility: HOSPITAL | Age: 38
End: 2018-12-10

## 2018-12-10 NOTE — TELEPHONE ENCOUNTER
Issues Identified: (actual or potential)  Supplementation, Inadequate feeding, Latch difficulty, Inadequate supply and s/p tongue tie revision    Education Provided  Positioning / latch techniques, Indications for supplementation, Milk supply support, Pump

## 2018-12-14 ENCOUNTER — ANESTHESIA (OUTPATIENT)
Dept: SURGERY | Facility: HOSPITAL | Age: 38
End: 2018-12-14

## 2018-12-14 ENCOUNTER — HOSPITAL ENCOUNTER (OUTPATIENT)
Facility: HOSPITAL | Age: 38
Setting detail: HOSPITAL OUTPATIENT SURGERY
Discharge: HOME | End: 2018-12-14
Attending: OBSTETRICS & GYNECOLOGY | Admitting: OBSTETRICS & GYNECOLOGY
Payer: COMMERCIAL

## 2018-12-14 ENCOUNTER — ANESTHESIA EVENT (OUTPATIENT)
Dept: SURGERY | Facility: HOSPITAL | Age: 38
End: 2018-12-14

## 2018-12-14 VITALS
BODY MASS INDEX: 29.31 KG/M2 | TEMPERATURE: 98 F | SYSTOLIC BLOOD PRESSURE: 118 MMHG | RESPIRATION RATE: 18 BRPM | HEIGHT: 69 IN | DIASTOLIC BLOOD PRESSURE: 62 MMHG | WEIGHT: 197.88 LBS | OXYGEN SATURATION: 95 % | HEART RATE: 55 BPM

## 2018-12-14 DIAGNOSIS — O02.1 MISSED AB: ICD-10-CM

## 2018-12-14 PROCEDURE — 88305 TISSUE EXAM BY PATHOLOGIST: CPT | Performed by: OBSTETRICS & GYNECOLOGY

## 2018-12-14 PROCEDURE — 10D17ZZ EXTRACTION OF PRODUCTS OF CONCEPTION, RETAINED, VIA NATURAL OR ARTIFICIAL OPENING: ICD-10-PCS | Performed by: OBSTETRICS & GYNECOLOGY

## 2018-12-14 RX ORDER — DIPHENHYDRAMINE HYDROCHLORIDE 50 MG/ML
12.5 INJECTION INTRAMUSCULAR; INTRAVENOUS AS NEEDED
Status: DISCONTINUED | OUTPATIENT
Start: 2018-12-14 | End: 2018-12-15

## 2018-12-14 RX ORDER — ONDANSETRON 2 MG/ML
4 INJECTION INTRAMUSCULAR; INTRAVENOUS AS NEEDED
Status: DISCONTINUED | OUTPATIENT
Start: 2018-12-14 | End: 2018-12-15

## 2018-12-14 RX ORDER — HYDROMORPHONE HYDROCHLORIDE 1 MG/ML
0.4 INJECTION, SOLUTION INTRAMUSCULAR; INTRAVENOUS; SUBCUTANEOUS EVERY 5 MIN PRN
Status: DISCONTINUED | OUTPATIENT
Start: 2018-12-14 | End: 2018-12-15

## 2018-12-14 RX ORDER — MIDAZOLAM HYDROCHLORIDE 1 MG/ML
1 INJECTION INTRAMUSCULAR; INTRAVENOUS EVERY 5 MIN PRN
Status: DISCONTINUED | OUTPATIENT
Start: 2018-12-14 | End: 2018-12-15

## 2018-12-14 RX ORDER — LIDOCAINE HYDROCHLORIDE 10 MG/ML
INJECTION, SOLUTION INFILTRATION; PERINEURAL AS NEEDED
Status: DISCONTINUED | OUTPATIENT
Start: 2018-12-14 | End: 2018-12-14 | Stop reason: HOSPADM

## 2018-12-14 RX ORDER — ACETAMINOPHEN 500 MG
1000 TABLET ORAL ONCE
Status: DISCONTINUED | OUTPATIENT
Start: 2018-12-14 | End: 2018-12-14 | Stop reason: HOSPADM

## 2018-12-14 RX ORDER — SODIUM CHLORIDE, SODIUM LACTATE, POTASSIUM CHLORIDE, CALCIUM CHLORIDE 600; 310; 30; 20 MG/100ML; MG/100ML; MG/100ML; MG/100ML
INJECTION, SOLUTION INTRAVENOUS CONTINUOUS
Status: DISCONTINUED | OUTPATIENT
Start: 2018-12-14 | End: 2018-12-15

## 2018-12-14 RX ORDER — HYDROCODONE BITARTRATE AND ACETAMINOPHEN 5; 325 MG/1; MG/1
2 TABLET ORAL AS NEEDED
Status: DISCONTINUED | OUTPATIENT
Start: 2018-12-14 | End: 2018-12-15

## 2018-12-14 RX ORDER — MEPERIDINE HYDROCHLORIDE 25 MG/ML
12.5 INJECTION INTRAMUSCULAR; INTRAVENOUS; SUBCUTANEOUS AS NEEDED
Status: DISCONTINUED | OUTPATIENT
Start: 2018-12-14 | End: 2018-12-15

## 2018-12-14 RX ORDER — HYDROCODONE BITARTRATE AND ACETAMINOPHEN 5; 325 MG/1; MG/1
1 TABLET ORAL AS NEEDED
Status: DISCONTINUED | OUTPATIENT
Start: 2018-12-14 | End: 2018-12-15

## 2018-12-14 RX ORDER — METOCLOPRAMIDE HYDROCHLORIDE 5 MG/ML
10 INJECTION INTRAMUSCULAR; INTRAVENOUS AS NEEDED
Status: DISCONTINUED | OUTPATIENT
Start: 2018-12-14 | End: 2018-12-15

## 2018-12-14 RX ORDER — NALOXONE HYDROCHLORIDE 0.4 MG/ML
80 INJECTION, SOLUTION INTRAMUSCULAR; INTRAVENOUS; SUBCUTANEOUS AS NEEDED
Status: DISCONTINUED | OUTPATIENT
Start: 2018-12-14 | End: 2018-12-15

## 2018-12-15 ENCOUNTER — TELEPHONE (OUTPATIENT)
Dept: LACTATION | Facility: HOSPITAL | Age: 38
End: 2018-12-15

## 2018-12-15 NOTE — ANESTHESIA PREPROCEDURE EVALUATION
PRE-OP EVALUATION    Patient Name: Kerline Rico    Pre-op Diagnosis: RETAINED PRODUCTS OF CONCEPTION    Procedure(s):  DILATION AND CURETTAGE    Surgeon(s) and Role:     Malachi Sherman MD - Primary    Pre-op vitals reviewed.         There is no heig Airway      Mallampati: II  Mouth opening: 3 FB  TM distance: 4 - 6 cm  Neck ROM: full Cardiovascular      Rhythm: regular  Rate: normal     Dental             Pulmonary      Breath sounds clear to auscultation bilaterally.                Other f

## 2018-12-15 NOTE — BRIEF OP NOTE
Pre-Operative Diagnosis: RETAINED PLACENTAL TISSUE     Post-Operative Diagnosis: RETAINED PLACENTAL TISSUE     Procedure Performed:   Procedure(s):  SUCTION DILATION AND CURETTAGE    Surgeon(s) and Role:     Sonia Alegria MD - Primary    Assistant(s):

## 2018-12-15 NOTE — ANESTHESIA POSTPROCEDURE EVALUATION
[address] [facility] Patient Status:  Hospital Outpatient Surgery   Age/Gender 41year old female MRN [id number]   Location [facility] Attending Estee Keller MD   [facility] Day # [facility]

## 2018-12-15 NOTE — TELEPHONE ENCOUNTER
Issues Identified: (actual or potential)  supplementation, inadequate feeding-shallow/painful latch, s/p tongue tie revision, decreased supply, retained placenta removed 12/14/18, possibly not stretching Melanie's tongue properly post revision as per maternal report per F/U with Pediatric Dentist    Infant Assessment  s/p tongue tie revision, supplementation, sleepy at breast    Education Provided  Medication and breastfeeding Information per  Infant Risk for methergine, possible decreased milk supply d/t retained placenta may improve now that it has been removed, increasing breastmilk supply and continued breastfeeding and exercises for Arun Gilliam post tongue tie revision encouraged    Follow Up  Call back as needed and Breastfeeding Clinic appointment scheduled for 12/21/18, recommended sooner as needed    Mother's response  Mother verbalized understanding.

## 2018-12-15 NOTE — H&P
Pt is almost 5 weeks postpartum - bleeding was almost gone by week 3 - started about 6 days ago with increased bleeding and clots - painful with passing the clots.  Had a fever of 103 just before delivery -was given ancef -  fever resolved and recurred the

## 2018-12-15 NOTE — OPERATIVE REPORT
[facility]    PATIENT'S NAME: Lilia Cliff   ATTENDING PHYSICIAN: Cristian Reed M.D. OPERATING PHYSICIAN: Cristian Reed M.D.    PATIENT ACCOUNT#:   [de-identified]    LOCATION:  [address] [place] [address] 1 EDWP 10  MEDICAL RECORD #:   [id number] 22:21:31  t: 12/15/2018 10:01:15  Job [id number]  JRJ/

## 2018-12-21 ENCOUNTER — TELEPHONE (OUTPATIENT)
Dept: OBGYN UNIT | Facility: HOSPITAL | Age: 38
End: 2018-12-21

## 2018-12-21 ENCOUNTER — NURSE ONLY (OUTPATIENT)
Dept: LACTATION | Facility: HOSPITAL | Age: 38
End: 2018-12-21
Attending: OBSTETRICS & GYNECOLOGY
Payer: COMMERCIAL

## 2018-12-21 PROCEDURE — 99213 OFFICE O/P EST LOW 20 MIN: CPT

## 2018-12-21 NOTE — PATIENT INSTRUCTIONS
Melina Cisneros weighs 8lbs 12.1oz. She latches bilaterally and transfers 83ml and is satisfied. Continue to breastfeed on demand minimum 8-12 times per day and monitor for minimum 6-8 wet and 3-4 stools per day.  Pump as needed with supplements and for breastmilk

## 2018-12-27 ENCOUNTER — NURSE ONLY (OUTPATIENT)
Dept: LACTATION | Facility: HOSPITAL | Age: 38
End: 2018-12-27
Attending: OBSTETRICS & GYNECOLOGY
Payer: COMMERCIAL

## 2018-12-27 DIAGNOSIS — O92.5 SUPPRESSED LACTATION WITH INFANT BREAST ATTACHMENT DIFFICULTY: Primary | ICD-10-CM

## 2018-12-27 PROCEDURE — 99214 OFFICE O/P EST MOD 30 MIN: CPT

## 2018-12-28 NOTE — PATIENT INSTRUCTIONS
Chuck Nam to both breasts bilaterally and breastfeeds well over approximately 8 minutes each breast, 36ml (1.2oz) right breast, 26ml (0.86oz) left breast, a total feed of 62m (2.1oz).  Additional 2oz of EBM given with Dr. Iqbal care home Level 1 bottle system, at least 8-12 times every 24 hours. · Compressing the breast when your baby sucks can increase milk flow. · At least 6-8 wet diapers and at least 3-4 soft, yellow seedy stools every 24 hours.  Use breastfeeding journal.  · Weight gain of at least 4-7 ounc

## 2019-01-03 ENCOUNTER — NURSE ONLY (OUTPATIENT)
Dept: LACTATION | Facility: HOSPITAL | Age: 39
End: 2019-01-03
Attending: PEDIATRICS
Payer: COMMERCIAL

## 2019-01-03 PROCEDURE — 99213 OFFICE O/P EST LOW 20 MIN: CPT

## 2019-01-03 NOTE — PATIENT INSTRUCTIONS
Carly Barclay weighs 9lbs 9.9oz, an increase of 5.3oz since last visit, 7 days ago. She transfers 44ml from right breast cross cradle.  She latches to left breast in cross cradle, cradle and upright latch and transfers an additional 30ml, a total breastfeed of 74

## 2019-02-02 ENCOUNTER — TELEPHONE (OUTPATIENT)
Dept: LACTATION | Facility: HOSPITAL | Age: 39
End: 2019-02-02

## 2019-02-02 NOTE — TELEPHONE ENCOUNTER
Issues Identified: (actual or potential)  Return to work issues    Infant Assessment  BF exclusively with intermittent bottle feeds when [email or web address] is running errands, pumping 1X at night, 3-4oz, breastfeeding better when quiet environment with pillow for support

## 2019-02-12 ENCOUNTER — TELEPHONE (OUTPATIENT)
Dept: LACTATION | Facility: HOSPITAL | Age: 39
End: 2019-02-12

## 2019-02-12 NOTE — TELEPHONE ENCOUNTER
Issues Identified: (actual or potential)  Return to work issues    Infant Assessment  BFW during the day, refusing breast in evening since Victoria Danger has restarted work    Education Provided  Return to [facility]    Follow Up  [employer]

## 2019-06-22 ENCOUNTER — LAB REQUISITION (OUTPATIENT)
Dept: LAB | Facility: HOSPITAL | Age: 39
End: 2019-06-22
Payer: COMMERCIAL

## 2019-06-22 DIAGNOSIS — Z01.818 ENCOUNTER FOR OTHER PREPROCEDURAL EXAMINATION: ICD-10-CM

## 2019-06-22 PROCEDURE — 88305 TISSUE EXAM BY PATHOLOGIST: CPT | Performed by: PLASTIC SURGERY

## 2019-07-22 ENCOUNTER — TELEPHONE (OUTPATIENT)
Dept: LACTATION | Facility: HOSPITAL | Age: 39
End: 2019-07-22

## 2019-07-22 NOTE — TELEPHONE ENCOUNTER
Caller had an MRI at [facility] today, is BF and pumped x1 and dumped breast milk after procedure. Injection was local and caller stated Dr. Dee johnson for BF after procedure.   Checked with [facility] MRI department and stated they typically use Gadolinium contra

## 2019-08-08 PROBLEM — M25.551 RIGHT HIP PAIN: Status: ACTIVE | Noted: 2019-08-08

## 2019-11-25 PROBLEM — M25.512 ACUTE PAIN OF LEFT SHOULDER: Status: RESOLVED | Noted: 2017-08-06 | Resolved: 2019-11-25

## 2019-11-25 PROBLEM — R50.9 FEVER: Status: RESOLVED | Noted: 2018-11-18 | Resolved: 2019-11-25

## 2019-11-25 PROBLEM — R10.9 ABDOMINAL PAIN, ACUTE: Status: RESOLVED | Noted: 2018-11-19 | Resolved: 2019-11-25

## 2019-11-25 PROBLEM — M75.42 IMPINGEMENT SYNDROME OF LEFT SHOULDER: Status: RESOLVED | Noted: 2017-08-30 | Resolved: 2019-11-25

## 2019-11-25 PROBLEM — M25.551 RIGHT HIP PAIN: Status: RESOLVED | Noted: 2019-08-08 | Resolved: 2019-11-25

## 2019-11-25 PROBLEM — R50.9 FEVER, UNSPECIFIED FEVER CAUSE: Status: RESOLVED | Noted: 2018-11-19 | Resolved: 2019-11-25

## 2020-02-20 PROBLEM — M25.851 HIP IMPINGEMENT SYNDROME, RIGHT: Status: ACTIVE | Noted: 2019-06-21

## 2020-02-20 PROBLEM — F41.9 ANXIETY: Status: ACTIVE | Noted: 2019-06-21

## 2021-04-07 PROBLEM — M75.101 ROTATOR CUFF SYNDROME OF RIGHT SHOULDER: Status: ACTIVE | Noted: 2021-04-07

## 2021-05-01 PROBLEM — M25.511 ACUTE PAIN OF RIGHT SHOULDER: Status: ACTIVE | Noted: 2021-05-01

## 2021-06-15 ENCOUNTER — HOSPITAL ENCOUNTER (OUTPATIENT)
Dept: MAMMOGRAPHY | Age: 41
Discharge: HOME | End: 2021-06-15
Attending: OBSTETRICS & GYNECOLOGY
Payer: COMMERCIAL

## 2021-06-15 DIAGNOSIS — Z01.419 ENCOUNTER FOR GYNECOLOGICAL EXAMINATION WITHOUT ABNORMAL FINDING: ICD-10-CM

## 2021-06-15 PROCEDURE — 77063 BREAST TOMOSYNTHESIS BI: CPT | Performed by: OBSTETRICS & GYNECOLOGY

## 2021-06-15 PROCEDURE — 77067 SCR MAMMO BI INCL CAD: CPT | Performed by: OBSTETRICS & GYNECOLOGY

## 2021-06-21 ENCOUNTER — HOSPITAL ENCOUNTER (OUTPATIENT)
Dept: MAMMOGRAPHY | Facility: HOSPITAL | Age: 41
Discharge: HOME | End: 2021-06-21
Attending: OBSTETRICS & GYNECOLOGY
Payer: COMMERCIAL

## 2021-06-21 DIAGNOSIS — R92.2 INCONCLUSIVE MAMMOGRAM: ICD-10-CM

## 2021-06-21 PROCEDURE — 77061 BREAST TOMOSYNTHESIS UNI: CPT | Performed by: OBSTETRICS & GYNECOLOGY

## 2021-06-21 PROCEDURE — 76642 ULTRASOUND BREAST LIMITED: CPT | Performed by: OBSTETRICS & GYNECOLOGY

## 2021-06-21 PROCEDURE — 77065 DX MAMMO INCL CAD UNI: CPT | Performed by: OBSTETRICS & GYNECOLOGY

## 2021-06-23 NOTE — PROGRESS NOTES
Pt called regarding results. Informed pt that results are released to patient and provider at the same time and Dr. Danika Wu has not reviewed.   Asking about fluid filled cyst--mom had breast cancer

## 2021-06-23 NOTE — PROGRESS NOTES
Pt called regarding results. Informed pt that results are released to patient and provider at the same time and Dr. Cyndy Birch has not reviewed.     Asking about fluid filled cyst--mom had breast cancer

## 2021-07-29 ENCOUNTER — HOSPITAL ENCOUNTER (OUTPATIENT)
Dept: MRI IMAGING | Age: 41
Discharge: HOME | End: 2021-07-29
Attending: INTERNAL MEDICINE
Payer: COMMERCIAL

## 2021-07-29 DIAGNOSIS — M25.512 CHRONIC LEFT SHOULDER PAIN: ICD-10-CM

## 2021-07-29 DIAGNOSIS — G89.29 CHRONIC LEFT SHOULDER PAIN: ICD-10-CM

## 2021-07-29 PROCEDURE — 73221 MRI JOINT UPR EXTREM W/O DYE: CPT | Performed by: INTERNAL MEDICINE

## 2021-08-02 NOTE — PROGRESS NOTES
Pt has recently viewed results on [email or web address], along with provider's comments.   Future Appointments  8/4/2021   4:20 PM    Radha Tierney MD       [id number]             [place] [id number] [facility]  8/5/2021   7:15 AM    Savanah Dupont MD       [address]              [facility] [place]

## 2021-08-05 PROBLEM — S43.439A SLAP (SUPERIOR LABRUM FROM ANTERIOR TO POSTERIOR) TEAR: Status: ACTIVE | Noted: 2021-08-05

## 2021-08-17 ENCOUNTER — HOSPITAL ENCOUNTER (OUTPATIENT)
Dept: MRI IMAGING | Age: 41
Discharge: HOME | End: 2021-08-17
Attending: OTOLARYNGOLOGY
Payer: COMMERCIAL

## 2021-08-17 DIAGNOSIS — H90.3 SENSORINEURAL HEARING LOSS (SNHL) OF BOTH EARS: ICD-10-CM

## 2021-08-17 PROCEDURE — 70553 MRI BRAIN STEM W/O & W/DYE: CPT | Performed by: OTOLARYNGOLOGY

## 2021-08-17 PROCEDURE — A9575 INJ GADOTERATE MEGLUMI 0.1ML: HCPCS | Performed by: OTOLARYNGOLOGY

## 2021-08-19 NOTE — PROGRESS NOTES
Called pt with results. If pt interested in seeing Neuro to call and we can put in an order for her.

## 2021-08-19 NOTE — PROGRESS NOTES
Please inform mri is showing no inner ear abnormalities seen, there are angel ov possible vascular changes which could be found with migraine findings would recommend she proceed with neurology due to findings and symptoms

## 2021-09-16 PROBLEM — M25.512 CHRONIC LEFT SHOULDER PAIN: Status: ACTIVE | Noted: 2021-09-16

## 2021-09-16 PROBLEM — G89.29 CHRONIC LEFT SHOULDER PAIN: Status: ACTIVE | Noted: 2021-09-16

## 2021-10-20 PROBLEM — M75.102 ROTATOR CUFF SYNDROME OF LEFT SHOULDER: Status: ACTIVE | Noted: 2021-04-07

## 2021-10-20 PROBLEM — M79.18 CERVICAL MYOFASCIAL PAIN SYNDROME: Status: ACTIVE | Noted: 2021-10-20

## 2021-11-10 ENCOUNTER — LAB ENCOUNTER (OUTPATIENT)
Dept: LAB | Facility: HOSPITAL | Age: 41
End: 2021-11-10
Attending: INTERNAL MEDICINE
Payer: COMMERCIAL

## 2021-11-10 DIAGNOSIS — U07.1 COVID: ICD-10-CM

## 2021-11-15 ENCOUNTER — ORDER TRANSCRIPTION (OUTPATIENT)
Dept: ADMINISTRATIVE | Facility: HOSPITAL | Age: 41
End: 2021-11-15

## 2021-11-15 ENCOUNTER — LAB ENCOUNTER (OUTPATIENT)
Dept: LAB | Facility: HOSPITAL | Age: 41
End: 2021-11-15
Attending: INTERNAL MEDICINE
Payer: COMMERCIAL

## 2021-11-15 DIAGNOSIS — Z11.59 SCREENING FOR VIRAL DISEASE: ICD-10-CM

## 2021-11-15 DIAGNOSIS — Z11.59 SCREENING FOR VIRAL DISEASE: Primary | ICD-10-CM

## 2021-11-20 NOTE — PROGRESS NOTES
Results previously released and reviewed by patient on [email or web address]  Nothing further needed from MD or RN.

## 2021-11-22 ENCOUNTER — ORDER TRANSCRIPTION (OUTPATIENT)
Dept: ADMINISTRATIVE | Facility: HOSPITAL | Age: 41
End: 2021-11-22

## 2021-11-22 DIAGNOSIS — Z01.84 IMMUNITY STATUS TESTING: Primary | ICD-10-CM

## 2021-11-24 ENCOUNTER — LAB ENCOUNTER (OUTPATIENT)
Dept: LAB | Facility: HOSPITAL | Age: 41
End: 2021-11-24
Attending: INTERNAL MEDICINE
Payer: COMMERCIAL

## 2021-11-24 DIAGNOSIS — Z01.84 IMMUNITY STATUS TESTING: ICD-10-CM

## 2021-12-16 ENCOUNTER — OFFICE VISIT (OUTPATIENT)
Dept: SURGERY | Facility: CLINIC | Age: 41
End: 2021-12-16
Payer: COMMERCIAL

## 2021-12-16 VITALS — TEMPERATURE: 97 F | DIASTOLIC BLOOD PRESSURE: 82 MMHG | HEART RATE: 82 BPM | SYSTOLIC BLOOD PRESSURE: 143 MMHG

## 2021-12-16 DIAGNOSIS — R10.12 LUQ ABDOMINAL PAIN: ICD-10-CM

## 2021-12-16 DIAGNOSIS — M79.18 MYOFASCIAL PAIN: ICD-10-CM

## 2021-12-16 DIAGNOSIS — K42.9 UMBILICAL HERNIA WITHOUT OBSTRUCTION AND WITHOUT GANGRENE: Primary | ICD-10-CM

## 2021-12-16 PROCEDURE — 3077F SYST BP >= 140 MM HG: CPT | Performed by: COLON & RECTAL SURGERY

## 2021-12-16 PROCEDURE — 3079F DIAST BP 80-89 MM HG: CPT | Performed by: COLON & RECTAL SURGERY

## 2021-12-16 PROCEDURE — 99245 OFF/OP CONSLTJ NEW/EST HI 55: CPT | Performed by: COLON & RECTAL SURGERY

## 2021-12-16 PROCEDURE — 20552 NJX 1/MLT TRIGGER POINT 1/2: CPT | Performed by: COLON & RECTAL SURGERY

## 2021-12-16 RX ORDER — TRIAMCINOLONE ACETONIDE 10 MG/ML
INJECTION, SUSPENSION INTRA-ARTICULAR; INTRALESIONAL
Qty: 5 ML | Refills: 0 | Status: SHIPPED | OUTPATIENT
Start: 2021-12-16

## 2021-12-16 RX ORDER — TRIAMCINOLONE ACETONIDE 10 MG/ML
INJECTION, SUSPENSION INTRA-ARTICULAR; INTRALESIONAL
Qty: 1 ML | Refills: 0 | Status: SHIPPED | OUTPATIENT
Start: 2021-12-16 | End: 2021-12-16 | Stop reason: CLARIF

## 2021-12-16 NOTE — H&P
New Patient Visit Note       Active Problems      1. Umbilical hernia without obstruction and without gangrene    2. LUQ abdominal pain    3.  Myofascial pain        Chief Complaint   Patient presents with:  Hernia: New PT abdominal pain possible hernia per patient has no significant past medical history. I acted as a scribe in this encounter. The physician obtained a history, independently performed a physical exam, and developed an assessment and plan.   The physician performed all medical decision ma normal in caliber. A normal appendix is identified. There is small to moderate   stool throughout the colon. PELVIC ORGANS: No abnormal pelvic masses. PERITONEUM: No free fluid.    LYMPH NODES: There is no evidence of mesenteric, retroperitoneal or ingu Brother      Social History    Socioeconomic History      Marital status:     Tobacco Use      Smoking status: Never Smoker      Smokeless tobacco: Never Used    Vaping Use      Vaping Use: Never used    Substance and Sexual Activity      Alcohol us throat and trouble swallowing. Respiratory: Negative for apnea, cough, shortness of breath and wheezing. Cardiovascular: Negative for chest pain, palpitations and leg swelling.    Gastrointestinal: Negative for abdominal distention, abdominal pain, an Hernia is present in the umbilical area. There is no hernia in the ventral area, left inguinal area or right inguinal area.           Comments: Clinical examination of the abdomen reveals a be soft, nondistended, focally tender in the left upper mid abdomen umbilical pain. She states she felt it was a separate pain. It occurred spontaneously. It was not associated with eating. She states she does not have any issues with reflux.   As the patient continued to have abdominal pain she called her primary care All questions from the patient were answered in detail. A description of the procedure(s) possible outcomes was fully discussed. The patient seemed to understand the conversation and its details.     Consent for the procedure(s) was confirmed with the pa

## 2021-12-16 NOTE — PATIENT INSTRUCTIONS
The patient presents today in consultation of umbilical pain and left upper quadrant pain. The patient states that since the birth of her 3 children she has noted a bulge at her umbilicus.   She states that after the birth of her kids they would go away, is focally tender in the left upper mid abdomen is myofascial in nature. There is no hernia at this site or acute etiology. We will be injecting this patient with a trigger point injection at this location where she is having her pain.   I discussed that

## 2021-12-17 ENCOUNTER — LAB ENCOUNTER (OUTPATIENT)
Dept: LAB | Facility: HOSPITAL | Age: 41
End: 2021-12-17
Attending: COLON & RECTAL SURGERY
Payer: COMMERCIAL

## 2021-12-17 DIAGNOSIS — R10.12 LUQ ABDOMINAL PAIN: ICD-10-CM

## 2021-12-20 ENCOUNTER — HOSPITAL ENCOUNTER (OUTPATIENT)
Dept: GENERAL RADIOLOGY | Facility: HOSPITAL | Age: 41
Discharge: HOME | End: 2021-12-20
Attending: COLON & RECTAL SURGERY
Payer: COMMERCIAL

## 2021-12-20 DIAGNOSIS — R10.12 LUQ ABDOMINAL PAIN: ICD-10-CM

## 2021-12-20 PROCEDURE — 74240 X-RAY XM UPR GI TRC 1CNTRST: CPT | Performed by: COLON & RECTAL SURGERY

## 2021-12-20 PROCEDURE — 74248 X-RAY SM INT F-THRU STD: CPT | Performed by: COLON & RECTAL SURGERY

## 2021-12-21 ENCOUNTER — OFFICE VISIT (OUTPATIENT)
Dept: SURGERY | Facility: CLINIC | Age: 41
End: 2021-12-21
Payer: COMMERCIAL

## 2021-12-21 VITALS — BODY MASS INDEX: 26.22 KG/M2 | HEIGHT: 69 IN | WEIGHT: 177 LBS | TEMPERATURE: 97 F

## 2021-12-21 DIAGNOSIS — K42.9 UMBILICAL HERNIA WITHOUT OBSTRUCTION AND WITHOUT GANGRENE: ICD-10-CM

## 2021-12-21 DIAGNOSIS — M79.18 MYOFASCIAL PAIN: ICD-10-CM

## 2021-12-21 DIAGNOSIS — R10.12 LUQ ABDOMINAL PAIN: Primary | ICD-10-CM

## 2021-12-21 PROCEDURE — 99214 OFFICE O/P EST MOD 30 MIN: CPT | Performed by: COLON & RECTAL SURGERY

## 2021-12-21 PROCEDURE — 3008F BODY MASS INDEX DOCD: CPT | Performed by: COLON & RECTAL SURGERY

## 2021-12-21 NOTE — PROGRESS NOTES
Follow Up Visit Note       Active Problems      1. LUQ abdominal pain    2. Myofascial pain    3.  Umbilical hernia without obstruction and without gangrene          Chief Complaint   Patient presents with:  Hernia: Umbilical Hernia- Pt states has pain when communicating with other healthcare professionals, documenting clinical information, independently interpreting results, coordinating care, and communication of any results that were available at today's visit.           Allergies  Swapna Phlegm is allergic to ce Outpatient Medications:   •  triamcinolone acetonide (KENALOG) 10 MG/ML Injection Suspension, BRING TO APPOINTMENT, Disp: 5 mL, Rfl: 0  •  topiramate (TOPAMAX) 25 MG Oral Tab, 1 tablet PO at bedtime for 1 week then 2 tablets PO at bedtime, Disp: 60 tablet, disturbance. Physical Findings   Temp 97.3 °F (36.3 °C)   Ht 69\"   Wt 177 lb (80.3 kg)   LMP 12/06/2021 (Approximate)   BMI 26.14 kg/m²   Physical Exam  Vitals and nursing note reviewed.    Abdominal:          Comments: Clinical exam reveals the abd location. Clinical exam reveals the abdomen to be soft, minimally tender at the trigger point site. I cannot reproduce the severe symptoms that were present prior to injection. She has a small umbilical hernia that protrudes on Valsalva.   There is akash orders of the defined types were placed in this encounter. Imaging & Referrals   None    Follow Up  No follow-ups on file.     Blair Swanson MD

## 2021-12-22 NOTE — PATIENT INSTRUCTIONS
This patient has a diagnosis of both myofascial pain in a specific site in the upper abdomen just to the left of midline, and a very symptomatic umbilical hernia. Although they are close to each other, problems were separate.     We performed trigger point pulling, and lifting. I discussed with her that she would have to avoid this for up to 8 weeks after surgery. She will not have pain, but she could lead to early recurrence of the hernia if she overdoes it in the first several weeks after operation.

## 2021-12-29 ENCOUNTER — HOSPITAL ENCOUNTER (EMERGENCY)
Facility: HOSPITAL | Age: 41
Discharge: LEFT BEFORE DISCHARGE | End: 2021-12-29
Payer: COMMERCIAL

## 2021-12-29 VITALS
HEART RATE: 98 BPM | WEIGHT: 176.38 LBS | RESPIRATION RATE: 18 BRPM | BODY MASS INDEX: 26 KG/M2 | SYSTOLIC BLOOD PRESSURE: 148 MMHG | TEMPERATURE: 98 F | OXYGEN SATURATION: 97 % | DIASTOLIC BLOOD PRESSURE: 99 MMHG

## 2021-12-30 NOTE — ED INITIAL ASSESSMENT (HPI)
Pt dx with Covid 2 days ago, sx since last week. Including migraine/jaw pain. No relief from regular migraine meds.

## 2022-01-31 ENCOUNTER — TELEPHONE (OUTPATIENT)
Dept: SURGERY | Facility: CLINIC | Age: 42
End: 2022-01-31

## 2022-01-31 DIAGNOSIS — Z01.818 PRE-OPERATIVE EXAMINATION: Primary | ICD-10-CM

## 2022-05-02 ENCOUNTER — TELEPHONE (OUTPATIENT)
Dept: SURGERY | Facility: CLINIC | Age: 42
End: 2022-05-02

## 2022-05-02 NOTE — TELEPHONE ENCOUNTER
Pt having umbilical hernia surgery and wants to know post-op instructions. Discussed lifting restrictions along with activitiy restrictions. Pt MARTHA.

## 2022-05-28 ENCOUNTER — TELEPHONE (OUTPATIENT)
Dept: SCHEDULING | Age: 42
End: 2022-05-28

## 2022-05-28 ENCOUNTER — WALK IN (OUTPATIENT)
Dept: URGENT CARE | Age: 42
End: 2022-05-28

## 2022-05-28 VITALS
TEMPERATURE: 98.2 F | BODY MASS INDEX: 25.48 KG/M2 | DIASTOLIC BLOOD PRESSURE: 78 MMHG | WEIGHT: 172 LBS | SYSTOLIC BLOOD PRESSURE: 118 MMHG | HEIGHT: 69 IN

## 2022-05-28 DIAGNOSIS — J02.9 SORE THROAT: Primary | ICD-10-CM

## 2022-05-28 LAB
INTERNAL PROCEDURAL CONTROLS ACCEPTABLE: YES
S PYO AG THROAT QL IA.RAPID: NEGATIVE
TEST LOT EXPIRATION DATE: NORMAL
TEST LOT NUMBER: NORMAL

## 2022-05-28 PROCEDURE — 99212 OFFICE O/P EST SF 10 MIN: CPT | Performed by: NURSE PRACTITIONER

## 2022-05-28 ASSESSMENT — ENCOUNTER SYMPTOMS
EYES NEGATIVE: 1
RESPIRATORY NEGATIVE: 1
SORE THROAT: 1
CONSTITUTIONAL NEGATIVE: 1
RHINORRHEA: 1

## 2022-06-01 ENCOUNTER — TELEPHONE (OUTPATIENT)
Dept: SURGERY | Facility: CLINIC | Age: 42
End: 2022-06-01

## 2022-06-07 ENCOUNTER — LAB ENCOUNTER (OUTPATIENT)
Dept: LAB | Age: 42
End: 2022-06-07
Attending: COLON & RECTAL SURGERY
Payer: COMMERCIAL

## 2022-06-07 DIAGNOSIS — Z01.818 PRE-OPERATIVE EXAMINATION: ICD-10-CM

## 2022-06-07 LAB — SARS-COV-2 RNA RESP QL NAA+PROBE: NOT DETECTED

## 2022-06-10 ENCOUNTER — ANESTHESIA EVENT (OUTPATIENT)
Dept: SURGERY | Facility: HOSPITAL | Age: 42
End: 2022-06-10
Payer: COMMERCIAL

## 2022-06-10 ENCOUNTER — ANESTHESIA (OUTPATIENT)
Dept: SURGERY | Facility: HOSPITAL | Age: 42
End: 2022-06-10
Payer: COMMERCIAL

## 2022-06-10 ENCOUNTER — HOSPITAL ENCOUNTER (OUTPATIENT)
Facility: HOSPITAL | Age: 42
Setting detail: HOSPITAL OUTPATIENT SURGERY
Discharge: HOME | End: 2022-06-10
Attending: COLON & RECTAL SURGERY | Admitting: COLON & RECTAL SURGERY
Payer: COMMERCIAL

## 2022-06-10 VITALS
RESPIRATION RATE: 16 BRPM | WEIGHT: 171.94 LBS | DIASTOLIC BLOOD PRESSURE: 64 MMHG | BODY MASS INDEX: 25.47 KG/M2 | HEIGHT: 69 IN | SYSTOLIC BLOOD PRESSURE: 114 MMHG | TEMPERATURE: 98 F | OXYGEN SATURATION: 100 % | HEART RATE: 94 BPM

## 2022-06-10 DIAGNOSIS — K42.9 UMBILICAL HERNIA WITHOUT OBSTRUCTION AND WITHOUT GANGRENE: ICD-10-CM

## 2022-06-10 LAB — B-HCG UR QL: NEGATIVE

## 2022-06-10 PROCEDURE — 0WQF0ZZ REPAIR ABDOMINAL WALL, OPEN APPROACH: ICD-10-PCS | Performed by: COLON & RECTAL SURGERY

## 2022-06-10 PROCEDURE — 81025 URINE PREGNANCY TEST: CPT

## 2022-06-10 RX ORDER — DEXAMETHASONE SODIUM PHOSPHATE 4 MG/ML
VIAL (ML) INJECTION AS NEEDED
Status: DISCONTINUED | OUTPATIENT
Start: 2022-06-10 | End: 2022-06-10 | Stop reason: SURG

## 2022-06-10 RX ORDER — CLINDAMYCIN PHOSPHATE 900 MG/50ML
900 INJECTION INTRAVENOUS ONCE
Status: COMPLETED | OUTPATIENT
Start: 2022-06-10 | End: 2022-06-10

## 2022-06-10 RX ORDER — KETOROLAC TROMETHAMINE 30 MG/ML
30 INJECTION, SOLUTION INTRAMUSCULAR; INTRAVENOUS ONCE
Status: COMPLETED | OUTPATIENT
Start: 2022-06-10 | End: 2022-06-10

## 2022-06-10 RX ORDER — HYDROMORPHONE HYDROCHLORIDE 1 MG/ML
0.6 INJECTION, SOLUTION INTRAMUSCULAR; INTRAVENOUS; SUBCUTANEOUS EVERY 5 MIN PRN
Status: DISCONTINUED | OUTPATIENT
Start: 2022-06-10 | End: 2022-06-10

## 2022-06-10 RX ORDER — ONDANSETRON 2 MG/ML
4 INJECTION INTRAMUSCULAR; INTRAVENOUS EVERY 6 HOURS PRN
Status: DISCONTINUED | OUTPATIENT
Start: 2022-06-10 | End: 2022-06-10

## 2022-06-10 RX ORDER — HYDROCODONE BITARTRATE AND ACETAMINOPHEN 5; 325 MG/1; MG/1
2 TABLET ORAL ONCE AS NEEDED
Status: DISCONTINUED | OUTPATIENT
Start: 2022-06-10 | End: 2022-06-10

## 2022-06-10 RX ORDER — HYDROCODONE BITARTRATE AND ACETAMINOPHEN 5; 325 MG/1; MG/1
1 TABLET ORAL ONCE AS NEEDED
Status: DISCONTINUED | OUTPATIENT
Start: 2022-06-10 | End: 2022-06-10

## 2022-06-10 RX ORDER — KETAMINE HYDROCHLORIDE 50 MG/ML
INJECTION, SOLUTION, CONCENTRATE INTRAMUSCULAR; INTRAVENOUS AS NEEDED
Status: DISCONTINUED | OUTPATIENT
Start: 2022-06-10 | End: 2022-06-10 | Stop reason: SURG

## 2022-06-10 RX ORDER — SODIUM CHLORIDE, SODIUM LACTATE, POTASSIUM CHLORIDE, CALCIUM CHLORIDE 600; 310; 30; 20 MG/100ML; MG/100ML; MG/100ML; MG/100ML
INJECTION, SOLUTION INTRAVENOUS CONTINUOUS
Status: DISCONTINUED | OUTPATIENT
Start: 2022-06-10 | End: 2022-06-10

## 2022-06-10 RX ORDER — HYDROMORPHONE HYDROCHLORIDE 1 MG/ML
INJECTION, SOLUTION INTRAMUSCULAR; INTRAVENOUS; SUBCUTANEOUS
Status: COMPLETED
Start: 2022-06-10 | End: 2022-06-10

## 2022-06-10 RX ORDER — PROCHLORPERAZINE EDISYLATE 5 MG/ML
5 INJECTION INTRAMUSCULAR; INTRAVENOUS EVERY 8 HOURS PRN
Status: DISCONTINUED | OUTPATIENT
Start: 2022-06-10 | End: 2022-06-10

## 2022-06-10 RX ORDER — HEPARIN SODIUM 5000 [USP'U]/ML
5000 INJECTION, SOLUTION INTRAVENOUS; SUBCUTANEOUS ONCE
Status: COMPLETED | OUTPATIENT
Start: 2022-06-10 | End: 2022-06-10

## 2022-06-10 RX ORDER — NEOSTIGMINE METHYLSULFATE 1 MG/ML
INJECTION INTRAVENOUS AS NEEDED
Status: DISCONTINUED | OUTPATIENT
Start: 2022-06-10 | End: 2022-06-10 | Stop reason: SURG

## 2022-06-10 RX ORDER — HYDROMORPHONE HYDROCHLORIDE 1 MG/ML
0.2 INJECTION, SOLUTION INTRAMUSCULAR; INTRAVENOUS; SUBCUTANEOUS EVERY 5 MIN PRN
Status: DISCONTINUED | OUTPATIENT
Start: 2022-06-10 | End: 2022-06-10

## 2022-06-10 RX ORDER — ONDANSETRON 2 MG/ML
INJECTION INTRAMUSCULAR; INTRAVENOUS AS NEEDED
Status: DISCONTINUED | OUTPATIENT
Start: 2022-06-10 | End: 2022-06-10 | Stop reason: SURG

## 2022-06-10 RX ORDER — MIDAZOLAM HYDROCHLORIDE 1 MG/ML
1 INJECTION INTRAMUSCULAR; INTRAVENOUS ONCE
Status: COMPLETED | OUTPATIENT
Start: 2022-06-10 | End: 2022-06-10

## 2022-06-10 RX ORDER — HYDROCODONE BITARTRATE AND ACETAMINOPHEN 5; 325 MG/1; MG/1
1 TABLET ORAL EVERY 6 HOURS PRN
Qty: 8 TABLET | Refills: 0 | Status: SHIPPED | OUTPATIENT
Start: 2022-06-10

## 2022-06-10 RX ORDER — HYDROMORPHONE HYDROCHLORIDE 1 MG/ML
0.4 INJECTION, SOLUTION INTRAMUSCULAR; INTRAVENOUS; SUBCUTANEOUS EVERY 5 MIN PRN
Status: DISCONTINUED | OUTPATIENT
Start: 2022-06-10 | End: 2022-06-10

## 2022-06-10 RX ORDER — LIDOCAINE HYDROCHLORIDE 10 MG/ML
INJECTION, SOLUTION EPIDURAL; INFILTRATION; INTRACAUDAL; PERINEURAL AS NEEDED
Status: DISCONTINUED | OUTPATIENT
Start: 2022-06-10 | End: 2022-06-10 | Stop reason: SURG

## 2022-06-10 RX ORDER — ACETAMINOPHEN 500 MG
1000 TABLET ORAL ONCE AS NEEDED
Status: DISCONTINUED | OUTPATIENT
Start: 2022-06-10 | End: 2022-06-10

## 2022-06-10 RX ORDER — GLYCOPYRROLATE 0.2 MG/ML
INJECTION, SOLUTION INTRAMUSCULAR; INTRAVENOUS AS NEEDED
Status: DISCONTINUED | OUTPATIENT
Start: 2022-06-10 | End: 2022-06-10 | Stop reason: SURG

## 2022-06-10 RX ORDER — NALOXONE HYDROCHLORIDE 0.4 MG/ML
80 INJECTION, SOLUTION INTRAMUSCULAR; INTRAVENOUS; SUBCUTANEOUS AS NEEDED
Status: DISCONTINUED | OUTPATIENT
Start: 2022-06-10 | End: 2022-06-10

## 2022-06-10 RX ORDER — SCOLOPAMINE TRANSDERMAL SYSTEM 1 MG/1
1 PATCH, EXTENDED RELEASE TRANSDERMAL ONCE
Status: DISCONTINUED | OUTPATIENT
Start: 2022-06-10 | End: 2022-06-10 | Stop reason: HOSPADM

## 2022-06-10 RX ORDER — BUPIVACAINE HYDROCHLORIDE AND EPINEPHRINE 5; 5 MG/ML; UG/ML
INJECTION, SOLUTION EPIDURAL; INTRACAUDAL; PERINEURAL AS NEEDED
Status: DISCONTINUED | OUTPATIENT
Start: 2022-06-10 | End: 2022-06-10 | Stop reason: HOSPADM

## 2022-06-10 RX ORDER — MIDAZOLAM HYDROCHLORIDE 1 MG/ML
INJECTION INTRAMUSCULAR; INTRAVENOUS
Status: COMPLETED
Start: 2022-06-10 | End: 2022-06-10

## 2022-06-10 RX ORDER — KETOROLAC TROMETHAMINE 30 MG/ML
INJECTION, SOLUTION INTRAMUSCULAR; INTRAVENOUS
Status: COMPLETED
Start: 2022-06-10 | End: 2022-06-10

## 2022-06-10 RX ORDER — ACETAMINOPHEN 500 MG
1000 TABLET ORAL ONCE
Status: DISCONTINUED | OUTPATIENT
Start: 2022-06-10 | End: 2022-06-10 | Stop reason: HOSPADM

## 2022-06-10 RX ADMIN — LIDOCAINE HYDROCHLORIDE 50 MG: 10 INJECTION, SOLUTION EPIDURAL; INFILTRATION; INTRACAUDAL; PERINEURAL at 10:23:00

## 2022-06-10 RX ADMIN — CLINDAMYCIN PHOSPHATE 900 MG: 900 INJECTION INTRAVENOUS at 10:32:00

## 2022-06-10 RX ADMIN — SODIUM CHLORIDE, SODIUM LACTATE, POTASSIUM CHLORIDE, CALCIUM CHLORIDE: 600; 310; 30; 20 INJECTION, SOLUTION INTRAVENOUS at 11:10:00

## 2022-06-10 RX ADMIN — NEOSTIGMINE METHYLSULFATE 4 MG: 1 INJECTION INTRAVENOUS at 10:58:00

## 2022-06-10 RX ADMIN — DEXAMETHASONE SODIUM PHOSPHATE 4 MG: 4 MG/ML VIAL (ML) INJECTION at 10:28:00

## 2022-06-10 RX ADMIN — KETAMINE HYDROCHLORIDE 25 MG: 50 INJECTION, SOLUTION, CONCENTRATE INTRAMUSCULAR; INTRAVENOUS at 10:32:00

## 2022-06-10 RX ADMIN — GLYCOPYRROLATE 0.8 MG: 0.2 INJECTION, SOLUTION INTRAMUSCULAR; INTRAVENOUS at 10:58:00

## 2022-06-10 RX ADMIN — ONDANSETRON 4 MG: 2 INJECTION INTRAMUSCULAR; INTRAVENOUS at 10:58:00

## 2022-06-10 NOTE — ANESTHESIA PROCEDURE NOTES
Airway  Date/Time: 6/10/2022 10:25 AM  Urgency: elective    Airway not difficult    General Information and Staff    Patient location during procedure: OR  Anesthesiologist: Adeline Woo DO  Performed: anesthesiologist     Indications and Patient Condition  Indications for airway management: anesthesia  Spontaneous ventilation: present  Sedation level: deep  Preoxygenated: yes  Patient position: sniffing  Mask difficulty assessment: 1 - vent by mask    Final Airway Details  Final airway type: endotracheal airway      Successful airway: ETT  Cuffed: yes   Successful intubation technique: direct laryngoscopy  Endotracheal tube insertion site: oral  Blade: Cristobal  Blade size: #3  ETT size (mm): 7.5    Cormack-Lehane Classification: grade I - full view of glottis  Placement verified by: chest auscultation and capnometry   Measured from: lips  ETT to lips (cm): 21  Number of attempts at approach: 1  Ventilation between attempts: none  Number of other approaches attempted: 0    Additional Comments  Atraumatic insertion of ETT  Dentition intact as preinduction

## 2022-06-10 NOTE — OPERATIVE REPORT
[facility]  Op Note    Danuta Perkins Location: OR   CSN [id number] MRN [id number]   Admission Date 6/10/2022 Operation Date 6/10/2022   Attending Physician Gigi Garcia MD Operating Physician Jeannene Lesch, MD     Pre-Operative Diagnosis: Umbilical hernia incarcerated    Post-Operative Diagnosis: Same as above    Procedure Performed: Umbilical Herniorrhaphy incarcerated    Surgeon(s) and Role:     * Gigi Garcia MD - Primary    Assistant: Demarcus Rogers PA-C    History of present illness: This patient has periumbilical pain that is to the left of the midline. She has an incarcerated umbilical hernia. She presents at this time for incarcerated umbilical herniorrhaphy    Anesthesia: General    Operative findings: The patient was found to have an umbilical hernia. It was repaired without complication. The incarcerated contents appeared to be preperitoneal fat. There were no defects underneath the fascia above or below the umbilical hernia. Specifically there was no fascial defect to the left of the midline with the patient has her point of maximal symptoms. No other palpable abnormalities were identified with my fifth digit doing the exploration. I was able to interrogate the abdominal wall up to 6 cm away from the umbilicus in all directions. Summary of Case: Following adequate general anesthesia, the patient was placed in the supine position on the operating table. The abdomen was scrubbed with chlorhexidine. Sterile drapes were placed with wide exposure of the abdomen from xiphoid to pubis. A curvilinear incision was made in the periumbilical region. The hernia sac and contents were dissected free from the surrounding structures. The fascial defect was identified. The hernia was repaired in a transverse fashion with interrupted #1 Ethibond suture. The skin was closed with a deep layer of 3-0 Vicryl used to fix the umbilicus and tack it down to the anterior fascia.     The skin was closed with running 5-0 undyed Vicryl in a subcuticular fashion. The operative site was injected with Marcaine 0.5% with epinephrine. The patient had Steri-Strips placed over benzoin adhesive. Sterile dressings were placed. The patient was delivered to recovery room in stable postoperative condition.       Pathologic Specimen: None    EBL: 3 cc               Michael Pimentel MD  6/10/2022  11:17 AM

## 2022-06-20 ENCOUNTER — OFFICE VISIT (OUTPATIENT)
Facility: LOCATION | Age: 42
End: 2022-06-20

## 2022-06-20 VITALS
HEART RATE: 70 BPM | SYSTOLIC BLOOD PRESSURE: 115 MMHG | HEIGHT: 69 IN | BODY MASS INDEX: 25.33 KG/M2 | TEMPERATURE: 98 F | DIASTOLIC BLOOD PRESSURE: 77 MMHG | WEIGHT: 171 LBS

## 2022-06-20 DIAGNOSIS — K42.9 UMBILICAL HERNIA WITHOUT OBSTRUCTION AND WITHOUT GANGRENE: Primary | ICD-10-CM

## 2022-06-20 PROCEDURE — 3078F DIAST BP <80 MM HG: CPT | Performed by: COLON & RECTAL SURGERY

## 2022-06-20 PROCEDURE — 3008F BODY MASS INDEX DOCD: CPT | Performed by: COLON & RECTAL SURGERY

## 2022-06-20 PROCEDURE — 3074F SYST BP LT 130 MM HG: CPT | Performed by: COLON & RECTAL SURGERY

## 2022-06-20 PROCEDURE — 99024 POSTOP FOLLOW-UP VISIT: CPT | Performed by: COLON & RECTAL SURGERY

## 2022-06-20 NOTE — PATIENT INSTRUCTIONS
I am seeing this patient for further care and treatment regarding her umbilical hernia repair performed on Ivana 10, 2022. This patient is doing extremely well. The patient has no nausea, vomiting, diarrhea, abdominal pain, or constipation. The patient has no complaints at the operative site. The patient has no incisional complaints. The patient denies fever or chills. Abdominal exam: The abdomen is soft, nontender, nondistended, with good bowel sounds. Incision: The incision is clean, dry, intact, without erythema or cellulitis. I have no further follow-up scheduled with this patient at this time. This patient can see me on an as-needed basis. This patient should return urgently for any problems or complications related to my surgical intervention.

## 2022-08-11 ENCOUNTER — TELEPHONE (OUTPATIENT)
Facility: LOCATION | Age: 42
End: 2022-08-11

## 2022-08-11 NOTE — TELEPHONE ENCOUNTER
Spoke with pt over the phone regarding concerns. Pt stated she was laying on the ground and felt something \"pop\" and has felt different in her abdomen since. Wants to ensure surgical site is okay. Scheduled pt to see Dr. Afsaneh Lambert on Monday to check surgical site. Pt verbalized understanding.

## 2022-08-11 NOTE — TELEPHONE ENCOUNTER
6/43 UMBILICAL HERNIORRHAPHY - Pt called stating she laid on the floor to play with her son and felt something \"pop\" or \"move\" in her abdomen and today when she woke up she states \"something feels different\" and is concerned. Pt would like to know if she needs to come in or what she needs to do.

## 2022-08-15 ENCOUNTER — OFFICE VISIT (OUTPATIENT)
Facility: LOCATION | Age: 42
End: 2022-08-15

## 2022-08-15 VITALS — HEART RATE: 74 BPM | TEMPERATURE: 98 F

## 2022-08-15 DIAGNOSIS — K42.9 UMBILICAL HERNIA WITHOUT OBSTRUCTION AND WITHOUT GANGRENE: Primary | ICD-10-CM

## 2022-08-15 PROCEDURE — 99024 POSTOP FOLLOW-UP VISIT: CPT | Performed by: COLON & RECTAL SURGERY

## 2022-08-16 NOTE — PATIENT INSTRUCTIONS
The patient presents for continued care and evaluation following an umbilical herniorrhaphy on 6/10/2022. The patient states last Thursday she was crawling on the floor and her daughter's room in attempt to not wake her up, when she felt a \"pop\" at her umbilicus. She states it was at the exact 8-week fouzia from her umbilical herniorrhaphy. She states she is concerned that her hernia has recurred. She does not notice any new bump in the area. She states since her operation, her umbilicus has not \"felt right. \"  She states she continues to have minor pain and a pressure sensation in the area. She denies any drainage from her incision site. Clinical examination of the abdomen reveals it to be soft, nondistended, nontender, bowel sounds are normal activity normal pitch. There  is no rebounding tenderness or guarding. There are no signs of ascites or peritonitis. The liver and spleen are nonpalpable. There are no palpable masses. There are no umbilical or inguinal hernias. There is no evidence of recurrence of her umbilical hernia. I informed the patient that there is no evidence of recurrence of her umbilical hernia. She may have stretch the fascia layer and caused a minor tear. If there is a defect in the fascia, she may develop recurrence of her umbilical hernia in the future. I instructed her to continue to monitor for any signs and symptoms of recurrence. The patient plans to return to work tomorrow. 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 The patient may resume all other normal activities. She may exercise. I would like her to refrain from any abdominal exercises or exercises that require straining her abdomen for a total of 10 to 12 weeks postoperatively. The patient expressed understanding to the above plan. She should call our office with any new or worsening symptoms related to her operation.   I will see her on an as-needed basis in the future.

## 2024-10-06 NOTE — PROGRESS NOTES
Pt is a 41year old female admitted to 103/-A. Patient presents with:  External Version: pt presents for external version     Pt is  37w1d intra-uterine pregnancy. History obtained, consents signed. Oriented to room, staff, and plan of care. Pt's mother reports cough, sinus congestion, runny nose, subjective fever since Friday. Pt has had episode of emesis when coughing. Pt has history of eczema and has rash on entire body, patient scratching. Pt abdominal breathing and very agitated. Wheezing noted. Mother reports patient up to date on vaccines and no recent travel out of the country.

## 2025-01-20 ENCOUNTER — APPOINTMENT (OUTPATIENT)
Age: 45
End: 2025-01-20
Attending: INTERNAL MEDICINE
Payer: COMMERCIAL

## 2025-02-19 ENCOUNTER — NURSE ONLY (OUTPATIENT)
Age: 45
End: 2025-02-19
Attending: INTERNAL MEDICINE
Payer: COMMERCIAL

## 2025-02-19 ENCOUNTER — OFFICE VISIT (OUTPATIENT)
Age: 45
End: 2025-02-19
Attending: INTERNAL MEDICINE
Payer: COMMERCIAL

## 2025-02-19 DIAGNOSIS — Z80.41 FAMILY HISTORY OF OVARIAN CANCER: ICD-10-CM

## 2025-02-19 DIAGNOSIS — Z80.3 FAMILY HISTORY OF BREAST CANCER: ICD-10-CM

## 2025-02-19 DIAGNOSIS — Z80.9 FAMILY HISTORY OF CANCER: ICD-10-CM

## 2025-02-19 DIAGNOSIS — Z84.81 FAMILY HISTORY OF GENETIC DISEASE CARRIER: Primary | ICD-10-CM

## 2025-02-19 NOTE — PROGRESS NOTES
Patient Name: Soraya Jimenez  YOB: 1980  Date of Visit: 2/19/2025    Reason for visit: Ms. 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    Referring Provider: Self/family    Medical History: Ms. Jimenez is a pleasant and generally healthy 44 year old female presenting with no personal history of tumors or cancers.    She sees the dermatologist regularly for a h/o eczema. She reports that her dermatologist has never noted any cafe au lait spots or neurofibromas.    She reports a h/o anxiety for which she is working with a psychologist. She also believes retrospectively that she may have had a learning disability when younger.    Ms. Jimenez's last mammogram with ultrasound was in 12/28/23, this showed probably benign microcysts in the right breast, she reports no prior breast bx. She has never had a colonoscopy. She retains her uterus, ovaries, and fallopian tubes.     Ms. Jimenez achieved menarche at approximately 10 years of age, is pre-menopausal, and has been pregnant 4 times, delivering her first of 3 children at 30y. Ms. Jimenez has a >10-year history of oral contraceptive use and denies any fertility or hormone replacement use.      Relevant Family History: Ms. Jimenez has 2 sons (14, 11y) and 1 daughter (6y). Her 14 year old son has two birth marks that reportedly look like cafe au lait spots per pt, he also has a dx of an autism spectrum disorder. Her 11 year old son reportedly has 3 spots appearing similar to cafe au lait spots and also has a dx of an autism spectrum disorder, dyslexia, and dyspraxia, she is thinking about having him evaluated by a pediatric geneticist.    She has 2 sisters (45y, 33y) and 2 brothers (41y, 39y), all with no tumor or cancer hx. Her 45 year old sister reportedly was already drawn for genetic testing, results  are unknown at this time. The 41 year old brother has a h/o ADD/ADHD and his son was born with a cleft lip.     Ms. Jimenez's mother (75y) has a h/o breast cancer dx at 52y and lymphoma dx at 69-70y, she had genetic testing in 2024 with Meagan Berumen, my colleague here at [facility], and was found to have a NF1 c.4306A>G ([id number]) possibly mosaic pathogenic variant and an CRISTEL c.8734A>G (p.[id number]) variant of uncertain significance ([employer] 48 gene common hereditary cancers + RNA panel, [id number], by report).     There are 2 maternal uncles (>70y), neither have biological children. Her maternal grandmother  in her early-70s dt ovarian cancer. The maternal grandfather  ~85y dt a CVA with no cancer hx. He had at least 2 sisters with a h/o a gynecological cancer.    Ms. Jimenez's father (73y) has no cancer hx, he had a CABG, he possibly has a h/o an autism spectrum disorder and ADHD.     There are 3 paternal aunts (1 aunt  at 61y dt complications from metastatic mantle cell lymphoma dx in her early-50s) and 2 paternal uncles. The paternal grandmother  at 98y dt natural causes. The paternal grandfather  at 65y dt lung cancer, he was a smoker. There may me two other paternal great aunt(s) and or uncle(s) with a h/o cancer, but exact info is limited.     The rest of the family history is negative for other significant genetic conditions, cancers, or birth defects of any kind. See scanned pedigree for full family history reported during the session.    Ms. Jimenez's maternal and paternal ethnicity is Malaysian/Polish/mixed-. She is not of any Ashkenazi Restorationist heritage.     Summary: Because Ms. Jimenez's mother was found to have a possibly mosaic pathogenic NF1 variant, Ms. Jimenez has up to an estimated 50% chance to have inherited the same NF1 variant as her mother, genetic testing is indicated.     I explained the difference between heterozygous variants and mosaic variants. We  also discussed the difference between constitutional mosaicism, including and excluding the germ (egg) cells, and mosaicism in the blood cells only (clonal hematopoiesis). She understands that should the NF1 gene variant be found in her, it would be in the heterozygous state and mean she has a genetic dx of NF1. We discussed generally features and management of a personal with NF1. I also explained that in this case her children would have a 50% chance of inheriting the variant as well. Should she test negative for the NF1 mutation, she would not have NF1 and her children would not be at risk to inherit the familial NF1 mutation either.     Ms. Jimenez's mother was also found to have a CRISTEL variant of uncertain significance, a variant of uncertain significance (VUS) means that a change has been identified in a cancer susceptibility gene; however, it is uncertain if the variant is pathogenic or a non-deleterious change. With time, the variant may be reclassified as either positive or negative. No changes in management of family members is recommended based on a VUS result. There is a 50% chance Ms. Jimenez has the same CRISTEL VUS as her mother.     I also reviewed that her paternal family history of cancer is not suspicious for a hereditary predisposition to cancer nor does it meet current professional guidelines, NCCN, indicating any additional genetic testing for hereditary cancer syndromes. Genetic testing for the familial NF1 variant and possibly the CRISTEL VUS (to enable reclassification f/u) would be recommended for Ms. Jimenez.     If testing is performed, three results are possible: positive, negative, and variant of uncertain significance.  A positive result indicates a pathogenic variant (harmful gene mutation) has been identified, and there is an increased risk for the cancers associated with the specific gene.  Since pathogenic variants in most cancer susceptibility genes are inherited in an autosomal  dominant fashion, siblings and children of individuals with a pathogenic variant have a 50% risk of carrying the same familial pathogenic variant as well.      A negative test result would indicate that no pathogenic variant was identified in a cancer susceptibility gene.  While testing detects gene mutations, it is possible for a genetic variant to be present and go undetected.  It is also possible for a genetic variant to be located in a gene other than those being tested.     A variant of uncertain significance means that a change has been identified a cancer susceptibility gene; however, it is uncertain if the variant is pathogenic or a non-deleterious (benign) change.  With time, the variant may be reclassified as either pathogenic or benign.    Since pathogenic variant identification is necessary, an affected family member is preferred in order to confirm that a pathogenic variant is indeed present in a particular family.  If the pathogenic variant can be identified in an affected individual, this information can be used to screen other at-risk individuals in the family.  Individuals who are positive for the same pathogenic variant would be expected to have a much greater risk for developing hereditary cancer during their lifetime than the general population and surveillance and management would be rigorous.  For those individuals who are found to not carry the pathogenic variant, risks for developing cancer during their lifetime would return to those expected for individuals in the general population.  It should be emphasized that absence of a pathogenic variant in an at-risk individual would not eliminate their risk for cancer, but simply return them to the risk expected for the general population. If no affected individual is available for testing, a negative result, while reassuring, cannot be completely informative of the familial cancer risk as it is unknown whether no pathogenic variant was found  because the individual tested is truly negative for the familial pathogenic variant or whether the familial pathogenic variant was unable to be detected by the genetic testing method used. In such cases, screening and follow-up should be guided by personal and family history.     It should be noted that no one under age 18y is recommended to have testing performed for mutations in high-penetrance cancer predisposition genes for adult-onset conditions. Ms. Gilbert genetic information is protected under the Genetic Information Nondiscrimination Act of 2008 ([place]). [place] is a federal law protecting individuals from genetic discrimination in health insurance and most places of employment. [place] protections against genetic discrimination do not apply to other forms of insurance such as life, long term care, disability, and  insurance. Individuals without such policies in place may wish to consider doing so before proceeding with genetic testing, since their genetic information could potentially be used to inform decisions concerning eligibility, premiums, and coverage.    Because Ms. Jimenez's mother was found to have a possibly mosaic NF1 c.4306A>G (p.[id number]) pathogenic variant, genetic testing for the familial NF1 gene variant is indicated based on the NCCN guidelines (BOP V.2.2025).      Ms. Jimenez appeared to understand the information presented. On the day of the visit Ms. Jimenez elected to proceed with genetic testing for the familial mutations. My office will call Ms. Jimenez as soon as results are received; post-test counseling can be scheduled at that time. Thank you for allowing me to participate in the care of your patient; please do not hesitate to contact my office if you have any questions or concerns, [phone].    Plan:   Blood was drawn and sent out to [facility] for family variant testing for the known familial NF1 c.4306A>G (p.[id number]) possibly mosaic pathogenic variant and the  CRISTEL c.8734A>G (p.[id number]) variant of uncertain significance (TAT: ~2 weeks, 2 genes).    The Genetics office will call Ms. Jimenez when results are available.  Recommendations for Ms. Jimenez and family members will depend the above genetic testing results.      Send to: [facility]  Time spent with patient: 50 minutes      Lilia Newman MS, [facility]

## 2025-02-27 ENCOUNTER — GENETICS ENCOUNTER (OUTPATIENT)
Dept: HEMATOLOGY/ONCOLOGY | Facility: HOSPITAL | Age: 45
End: 2025-02-27

## 2025-02-27 NOTE — PROGRESS NOTES
Patient Name: Soraya Jimenez  YOB: 1980    Referring Provider:  Self/family      Reason for Referral:  Ms. Jimenez had genetic testing performed on 2/19/2025 because of a family history of cancer with a NF1 c.4306A>G (p.[id number]) possibly mosaic pathogenic variant and an CRISTEL c.8734A>G (p.Nji1371Niv) heterozygous variant of uncertain significance previously found in her mother.      Genetic Testing Result:  Negative. Ms. Jimenez did not inherit the NF1 c.4306A>G (p.[id number]) pathogenic variant or the CRISTEL c.8734A>G (p.[id number]) variant of uncertain significance. No other pathogenic variants were found in the following 2 genes: CRISTEL, NF1. Please refer to the report from [facility] for additional testing information. These results were discussed in a voicemail message left at Ms. Jimenez's mobile/home phone number: [phone] on 2/27/2025.    Summary and Plan:  These results indicate that Ms. Jimenez did not inherit the NF1 c.4306A>G (p.Zhy3975Clw) pathogenic variant or the CRISTEL c.8734A>G (p.Sca3425Wbl) variant of uncertain significance previously found in her mother. Ms. Jimenez's children are not at risk to inherit either the NF1 or CRISTEL gene variant, as she does not carry the genetic variants and therefore, cannot pass them on.     I encouraged Ms. Jimenez to share the genetic test results with other relatives so that they may discuss the implications of this information with their health providers. Ms. Jimenez should also contact me if there are changes in her personal and/or family history. Please do not hesitate to contact my office if you have any questions or concerns, [phone].      Lilia Newman MS, CGC

## (undated) DIAGNOSIS — K42.9 UMBILICAL HERNIA WITHOUT OBSTRUCTION AND WITHOUT GANGRENE: Primary | ICD-10-CM

## (undated) DEVICE — BREAST-HERNIA-PORT CDS-LF: Brand: MEDLINE INDUSTRIES, INC.

## (undated) DEVICE — NEEDLE SPINAL 22X3-1/2 BLK

## (undated) DEVICE — SUT VICRYL 4-0 PC-1 J835H

## (undated) DEVICE — SOL  .9 1000ML BTL

## (undated) DEVICE — VIOLET BRAIDED (POLYGLACTIN 910), SYNTHETIC ABSORBABLE SUTURE: Brand: COATED VICRYL

## (undated) DEVICE — SLEEVE KENDALL SCD EXPRESS MED

## (undated) DEVICE — SPECIMEN CONTAINER,POSITIVE SEAL INDICATOR, OR PACKAGED: Brand: PRECISION

## (undated) DEVICE — KENDALL SCD EXPRESS SLEEVES, KNEE LENGTH, MEDIUM: Brand: KENDALL SCD

## (undated) DEVICE — TUBING SUCTION COLLECTION SET

## (undated) DEVICE — SOLUTION  .9 1000ML BTL

## (undated) DEVICE — CURRETTE 10MM CVD

## (undated) DEVICE — STERILE POLYISOPRENE POWDER-FREE SURGICAL GLOVES: Brand: PROTEXIS

## (undated) DEVICE — GYN CDS: Brand: MEDLINE INDUSTRIES, INC.

## (undated) DEVICE — CANISTER SAFETOUCH SYST DISP

## (undated) DEVICE — GAMMEX® PI HYBRID SIZE 6.5, STERILE POWDER-FREE SURGICAL GLOVE, POLYISOPRENE AND NEOPRENE BLEND: Brand: GAMMEX

## (undated) DEVICE — LIGHT HANDLE

## (undated) DEVICE — SUT ETHIBOND 1 CT-1 X425H

## (undated) NOTE — LETTER
21    Patient: Keyon Huitron  : 1980 Visit date:     Dear  Patrice Espinosa MD    Thank you for referring Keyon Huitron to my practice. Please find my assessment and plan below.        Assessment   Umbilical hernia wi abdomen with palpation. The patient has guarding on palpation of this area. There are no palpable masses or hernias in this location. She does have a small umbilical hernia that is reducible. Bowel sounds are normal activity normal pitch.   No evidence

## (undated) NOTE — LETTER
22    Patient: Carlos Estrada  : 1980 Visit date:     Dear  Shabbir Briceno MD    Thank you for referring Carlos Estrada to my practice. Please find my assessment and plan below. Assessment   Umbilical hernia without obstruction and without gangrene  (primary encounter diagnosis)      Plan   I am seeing this patient for further care and treatment regarding her umbilical hernia repair performed on Ivana 10, 2022. This patient is doing extremely well. The patient has no nausea, vomiting, diarrhea, abdominal pain, or constipation. The patient has no complaints at the operative site. The patient has no incisional complaints. The patient denies fever or chills. Abdominal exam: The abdomen is soft, nontender, nondistended, with good bowel sounds. Incision: The incision is clean, dry, intact, without erythema or cellulitis. I have no further follow-up scheduled with this patient at this time. This patient can see me on an as-needed basis. This patient should return urgently for any problems or complications related to my surgical intervention.     Sincerely,       Marlen Louise MD   CC: No Recipients

## (undated) NOTE — LETTER
21    Patient: Reid Will  : 1980 Visit date:     Dear  Eugene Rubin MD    Thank you for referring Reidmary Will to my practice. Please find my assessment and plan below.     Assessment   LUQ abdominal pain  (p conversation. The patient apparently has reflux symptoms and a small hiatal hernia. She was wondering if these are related to the hernia. They are unlikely related. Her reflux is well controlled with medical management.     We also discussed that she

## (undated) NOTE — LETTER
Katie Jensen [address] [address]  [place], [address] [facility]    Consent for Operation  Date: __________________                                Time: _______________    1.  I authorize the performance upon Tushar Tai the following operation:  Proced procedure has been videotaped, the surgeon will obtain the original videotape. The hospital will not be responsible for storage or maintenance of this tape.   7. For the purpose of advancing medical education, I consent to the admittance of observers to the STATEMENTS REQUIRING INSERTION OR COMPLETION WERE FILLED IN.     Signature of Patient:   ___________________________    When the patient is a minor or mentally incompetent to give consent:  Signature of person authorized to consent for patient: ____________ supplements, and pills I can buy without a prescription (including street drugs/illegal medications). Failure to inform my anesthesiologist about these medicines may increase my risk of anesthetic complications. iv.  If I am allergic to anything or have ha Anesthesiologist Signature     Date   Time  I have discussed the procedure and information above with the patient (or patient’s representative) and answered their questions. The patient or their representative has agreed to have anesthesia services.     ___

## (undated) NOTE — LETTER
22    Patient: Eliz Fong  : 1980 Visit date: 6566    Dear  Lamin Mckeon MD    Thank you for referring Eliz Fong to my practice. Please find my assessment and plan below. Assessment   Umbilical hernia without obstruction and without gangrene  (primary encounter diagnosis)    Plan   The patient presents for continued care and evaluation following an umbilical herniorrhaphy on 6/10/2022. The patient states last Thursday she was crawling on the floor and her daughter's room in attempt to not wake her up, when she felt a \"pop\" at her umbilicus. She states it was at the exact 8-week fouzia from her umbilical herniorrhaphy. She states she is concerned that her hernia has recurred. She does not notice any new bump in the area. She states since her operation, her umbilicus has not \"felt right. \"  She states she continues to have minor pain and a pressure sensation in the area. She denies any drainage from her incision site. Clinical examination of the abdomen reveals it to be soft, nondistended, nontender, bowel sounds are normal activity normal pitch. There  is no rebounding tenderness or guarding. There are no signs of ascites or peritonitis. The liver and spleen are nonpalpable. There are no palpable masses. There are no umbilical or inguinal hernias. There is no evidence of recurrence of her umbilical hernia. I informed the patient that there is no evidence of recurrence of her umbilical hernia. She may have stretch the fascia layer and caused a minor tear. If there is a defect in the fascia, she may develop recurrence of her umbilical hernia in the future. I instructed her to continue to monitor for any signs and symptoms of recurrence. The patient plans to return to work tomorrow. 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     The patient may resume all other normal activities. She may exercise. I would like her to refrain from any abdominal exercises or exercises that require straining her abdomen for a total of 10 to 12 weeks postoperatively. The patient expressed understanding to the above plan. She should call our office with any new or worsening symptoms related to her operation. I will see her on an as-needed basis in the future.        Sincerely,       Radha Rodriguez MD   CC: No Recipients